# Patient Record
Sex: MALE | Race: WHITE
[De-identification: names, ages, dates, MRNs, and addresses within clinical notes are randomized per-mention and may not be internally consistent; named-entity substitution may affect disease eponyms.]

---

## 2019-02-10 ENCOUNTER — HOSPITAL ENCOUNTER (INPATIENT)
Dept: HOSPITAL 80 - FED | Age: 24
LOS: 5 days | Discharge: NURSING, REHAB OR LONG-TERM CARE | DRG: 956 | End: 2019-02-15
Attending: ORTHOPAEDIC SURGERY | Admitting: ORTHOPAEDIC SURGERY
Payer: COMMERCIAL
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
## 2019-02-10 DIAGNOSIS — T79.1XXA: ICD-10-CM

## 2019-02-10 DIAGNOSIS — S72.352A: ICD-10-CM

## 2019-02-10 DIAGNOSIS — R09.02: ICD-10-CM

## 2019-02-10 DIAGNOSIS — S52.022A: ICD-10-CM

## 2019-02-10 DIAGNOSIS — D62: ICD-10-CM

## 2019-02-10 DIAGNOSIS — K59.00: ICD-10-CM
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
## 2019-02-10 DIAGNOSIS — I95.9: ICD-10-CM

## 2019-02-10 DIAGNOSIS — E87.1: ICD-10-CM

## 2019-02-10 DIAGNOSIS — S72.142A: Primary | ICD-10-CM

## 2019-02-10 LAB — PLATELET # BLD: 233 10^3/UL (ref 150–400)

## 2019-02-10 PROCEDURE — 0PSL04Z REPOSITION LEFT ULNA WITH INTERNAL FIXATION DEVICE, OPEN APPROACH: ICD-10-PCS | Performed by: ORTHOPAEDIC SURGERY

## 2019-02-10 PROCEDURE — A4565 SLINGS: HCPCS

## 2019-02-10 PROCEDURE — 0QS904Z REPOSITION LEFT FEMORAL SHAFT WITH INTERNAL FIXATION DEVICE, OPEN APPROACH: ICD-10-PCS | Performed by: ORTHOPAEDIC SURGERY

## 2019-02-10 PROCEDURE — C1713 ANCHOR/SCREW BN/BN,TIS/BN: HCPCS

## 2019-02-10 PROCEDURE — C1769 GUIDE WIRE: HCPCS

## 2019-02-10 PROCEDURE — 0QS706Z REPOSITION LEFT UPPER FEMUR WITH INTRAMEDULLARY INTERNAL FIXATION DEVICE, OPEN APPROACH: ICD-10-PCS | Performed by: ORTHOPAEDIC SURGERY

## 2019-02-10 PROCEDURE — 2W3DX1Z IMMOBILIZATION OF LEFT LOWER ARM USING SPLINT: ICD-10-PCS | Performed by: ORTHOPAEDIC SURGERY

## 2019-02-10 NOTE — PDANEPAE
ANE History of Present Illness





L femur and elbow ORIF





ANE Past Medical History





- Cardiovascular History


Hx Hypertension: No


Hx Arrhythmias: No


Hx Chest Pain: No


Hx Coronary Artery / Peripheral Vascular Disease: No


Hx CHF / Valvular Disease: No


Hx Palpitations: No





- Pulmonary History


Hx COPD: No


Hx Asthma/Reactive Airway Disease: No


Hx Recent Upper Respiratory Infection: No


Hx Oxygen in Use at Home: No


Hx Sleep Apnea: No





- Endocrine History


Hx Diabetes: No


Hypothyroid: No


Hyperthyroid: No


Obesity: no





- Renal History


Hx Renal Disorders: No





- Liver History


Hx Hepatic Disorders: No





- Neurological & Psychiatric Hx


Hx Neurological and Psychiatric Disorders: No





- Cancer History


Hx Cancer: No





- GI History


GERD: no


Hx Gastrointestinal Disorders: No





- Chronic Pain History


Chronic Pain: No





- Surgical History


Prior Surgeries: none





ANE Review of Systems


Review of Systems: 








- Exercise capacity


METS (RN): 4 METS





ANE Patient History





- Allergies


Allergies/Adverse Reactions: 








No Known Allergies Allergy (Verified 02/10/19 18:36)


 








- Home Medications


Home Medications: 








NK [No Known Home Meds]  02/10/19 [Last Taken Unknown]








- NPO status


NPO Since - Liquids (Date): 02/10/19


NPO Since - Liquids (Time): 14:30


NPO Since - Solids (Date): 02/10/19


NPO Since - Solids (Time): 13:30





- Smoking Hx


Smoking Status: Never smoked


Marijuana use: No





- Alcohol Use


Alcohol Use: Other (3 drinks/week)





- Family Anes Hx


Family Anes Hx: none





ANE Labs/Vital Signs





- Labs


Result Diagrams: 


 02/10/19 19:15





 02/10/19 19:15





- Vital Signs


Blood Pressure: 135/70


Heart Rate: 89


Respiratory Rate: 17


O2 Sat (%): 96


Height: 177.8 cm


Weight: 65.771 kg





ANE Physical Exam





- Airway


Neck exam: FROM


Mallampati Score: Class 1


Mouth exam: normal dental/mouth exam





- Pulmonary


Pulmonary: clear to auscultation





- Cardiovascular


Cardiovascular: regular rate and rhythym





- ASA Status


ASA Status: II, E





ANE Anesthesia Plan


Anesthesia Plan: general endotracheal anesthesia
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
## 2019-02-11 LAB — PLATELET # BLD: 171 10^3/UL (ref 150–400)

## 2019-02-11 RX ADMIN — OXYCODONE HYDROCHLORIDE AND ACETAMINOPHEN PRN TAB: 5; 325 TABLET ORAL at 07:38

## 2019-02-11 RX ADMIN — OXYCODONE HYDROCHLORIDE AND ACETAMINOPHEN PRN TAB: 5; 325 TABLET ORAL at 15:38

## 2019-02-11 RX ADMIN — SODIUM CHLORIDE SCH MLS: 900 INJECTION, SOLUTION INTRAVENOUS at 17:39

## 2019-02-11 RX ADMIN — OXYCODONE HYDROCHLORIDE AND ACETAMINOPHEN PRN TAB: 5; 325 TABLET ORAL at 08:45

## 2019-02-11 RX ADMIN — OXYCODONE HYDROCHLORIDE AND ACETAMINOPHEN PRN TAB: 5; 325 TABLET ORAL at 12:46

## 2019-02-11 RX ADMIN — RIVAROXABAN SCH MG: 10 TABLET, FILM COATED ORAL at 09:57

## 2019-02-11 RX ADMIN — OXYCODONE HYDROCHLORIDE PRN MG: 15 TABLET ORAL at 17:39
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
## 2019-02-11 NOTE — POSTANESTH
Post Anesthetic Evaluation


Cardiovascular Status: Similar to Pre-Op Cond, Other, See Comment (Pt. with 

intermittent tachycardia up to high 130's without any other signs and without 

complaints other than surgical pain. EKG with ST and prolonged QT, will place 

in telemetry.)


Respiratory Status: Normal, Stable


Level of Consciousness/Mental Status: Can Participate in Eval


Pain Control: Adequate, Prn Tx Ordered


Nausea/Vomiting Control: Adequate, Prn Tx Ordered


Complications Possibly Related to Anesthesia: None Noted
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
## 2019-02-11 NOTE — POSTOPPROG
Post Op Note


Date of Operation: 02/11/19


Surgeon: Adry Taylor


Anesthesia: LMA


Pre-op Diagnosis: l femur fx x2 and l olecranon fx


Procedure: l tfn with fluoro and l orif olecranon with fluoro


Inf/Abcess present in the surg proc area at time of surgery?: No


Depth: Deep Incisional (Fascial)


EBL: 100-500

## 2019-02-11 NOTE — GCON
[f rep st]



                                                                    CONSULTATION





CHIEF COMPLAINT:  Left hip pain, left arm pain.



PRESENT ILLNESS:  23-year-old male skiing [place] at [facility] [place] yesterday, struck a tree, brought in by a
mbulance.  Emergency room evaluation identified left olecranon fracture, as well as left intertrochan
teric hip fracture and midshaft left femur fracture.  The patient denied neck, chest, or abdominal pa
in at that time.  I was asked to see the patient for trauma consultation.



ALLERGIES:  None.



CURRENT MEDICATIONS:  None.



SOCIAL HISTORY:  Works as a [employer] technician.  Nonsmoker.  No alcohol use.



REVIEW OF SYSTEMS:  Denies asthma, heart trouble, diabetes, epilepsy, or rheumatic fever.



FAMILY HISTORY:  Noncontributory.



PAST SURGICAL HISTORY:  Previous surgery none.



PHYSICAL EXAM:  HEENT:  PERRL, EOMI.  Sclerae nonicteric.  Pharynx clear.  NECK:  Cervical spine nont
anatoliy.  Neck supple without adenopathy.  No supraclavicular or axillary crepitus.  Posterior spinal e
lements are nontender.  CHEST:  Sternum is nontender.  Chest stable to compression.  Lungs clear.  HE
ART:  Normal S1, S2 without murmur.  ABDOMEN:  Soft, benign.  MUSCULOSKELETAL:  Pelvis stable to comp
ression.  Right lower extremity is atraumatic and unremarkable.  Left is status post ORIF intertrocha
nteric and midshaft femur fracture with a long nail.  Left upper extremity is wrapped in an Ace and i
s status post ORIF of the olecranon fracture.



ASSESSMENT:  Patient is doing well status post open reduction, internal fixation olecranon fracture, 
left femur fracture, left hip fracture.  He is currently tachycardic, but seems quite comfortable.  A
bdomen soft, benign.  Lungs clear.  I do not think there is much likelihood of occult chest or abdomi
nal injury and I am not ordering any further imaging.  His hematocrit is reasonable.



RECOMMENDATIONS:  Postop care per Orthopedic surgery.





Job #:  [id number]

## 2019-02-11 NOTE — GCON
[f rep st]



                                                                    CONSULTATION





DATE OF CONSULTATION:  02/11/2019



CHIEF COMPLAINT:  Tachycardia.



HISTORY OF PRESENT ILLNESS:  Mr. Estrada is a pleasant 23-year-old gentleman with no major past medic
al history who was admitted to the [facility] on 02/10/2019 after a skiing accident a
t [place] sustaining left elbow and left femur fractures.  He subsequently underwent surgical interven
tion to address the fractures and had been doing reasonably well until the afternoon when he was note
d to be tachycardic with heart rate in the 140s.  The patient denied being in any significant pain wh
en he was tachycardic.  He seemed to be relatively asymptomatic, not experiencing any palpitations or
 chest pains or shortness of breath.  He does not have any personal or family history of thromboembol
ic disease.  He has not noted any recent rashes.  There have not been any acute neurologic changes in
 his status either.  An ECG was obtained which showed sinus tachycardia.



PAST MEDICAL HISTORY:  No major past medical history other than recent left olecranon and femur fract
ure.



PAST SURGICAL HISTORY:  No other surgeries other than orthopedic surgeries from this hospitalization.




MEDICATIONS:  Patient did not have any daily medications prior to coming into the hospital.  He is cu
rrently on acetaminophen, morphine, oxycodone, Xarelto 10 mg daily, and tramadol.



ALLERGIES:  No known drug allergies.



FAMILY HISTORY:  Mother and father both living, reportedly healthy.  There is no family history of DV
T or pulmonary embolism.



SOCIAL HISTORY:  Patient is a nonsmoker.  He is currently single but has a girlfriend.  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
he eco tech industry.



REVIEW OF SYSTEMS:  CONSTITUTIONAL:  No complaints of any subjective fevers or chills.  ENT:  No rece
nt upper respiratory illnesses.  CARDIOVASCULAR:  No complaints of chest pains, palpitations, or sync
opal episodes.  RESPIRATORY:  No subjective shortness of breath or pleuritic-type chest pains.  GI:  
No complaints of any nausea, vomiting, diarrhea, or constipation.  No focal abdominal pain.  :  No 
reports of any difficulty with urination.  NEUROLOGIC:  No complaints of any confusion or focal weakn
ess.  No complaints of headaches.  HEMATOLOGIC:  No history of deep vein thrombosis or pulmonary embo
lism.  PSYCHIATRIC:  No history of anxiety or depression.  ENDOCRINE:  No history of polyuria or heat
 intolerance.  SKIN:  No new skin rashes.  MUSCULOSKELETAL:  Patient states he has had good pain cont
rol with current pain medications in place.



PHYSICAL EXAM:  VITAL SIGNS:  Temperature 36.6, blood pressure 126/72, heart rate 145 but has improve
d into the low 100s currently, respiratory rate of 11, satting 91% on 2 L.  I did take him off oxygen
 during my evaluation and he became hypoxic around 83% on room air.  GENERAL:  Patient appears comfor
table.  He is awake, alert, conversant, able to provide a good history.  HEENT:  Extraocular movement
s appear intact.  No scleral icterus is noted.  NECK:  Supple.  No thyroid enlargement appreciated.  
CHEST:  Clear on auscultation with normal respiratory effort anteriorly.  HEART:  Regular, tachycardi
c.  No murmurs appreciated.  ABDOMEN:  Soft, nontender, nondistended.  :  No Medina catheter in plac
e.  EXTREMITIES:  No significant pitting edema or calf pain with palpation.  NEUROLOGIC:  Cranial ner
ves 2-12 appear grossly intact with 5/5 strength in extremities with limited testing of the left extr
emity secondary to fractures.



LABS:  White blood cell count initially 25, but on repeat today this is improved to 13, hemoglobin 10
, platelets 171, sodium 129, potassium 4.4, chloride 101, bicarb 25, BUN 15, creatinine 0.5, glucose 
of 125.  TSH is 0.787.  D-dimer 4.18.



IMAGING:  CT angiography of the chest did not show any evidence of a pulmonary embolism.  There is a 
nonspecific 4 x 3 mm noncalcified nodule at the periphery of the right major fissure.



ASSESSMENT/PLAN:  

1.  Tachycardia.  Patient appears to have sinus tachycardia by ECG.  Uncertain etiology currently.  T
here is no evidence of a pulmonary embolism by CT study.  I did consider the possibility of a fat emb
anita considering he had a femur fracture.  At the current time, his tachycardia seems to be subsiding 
and I recommend ongoing telemetry monitoring.  I did also consider the possibility of anemia playing 
a role as his hemoglobin did decrease from 14 to 10.8 within the past 24 hours.  I recommend a repeat
 hemoglobin again tomorrow morning for close monitoring.

2.  Acute hypoxic respiratory failure-no obvious etiology on CT of the chest to account for the respi
ratory failure, but he did become hypoxic on room air during my evaluation into the 83% on room air. 
 Possibly related to pain medications and decreased respiratory drive.  Continue to monitor closely a
nd wean oxygen as able.

3.  Leukocytosis-no evidence of fevers at the current time.  Has improved substantially from 25 to 13
.  This may be reactive secondary to physical stress from his fracture.  Repeat has been ordered agai
n for tomorrow morning.

4.  Hyponatremia-IV fluids changed from D5 NS to normal saline with a rate of 75 mL/h.  Reassess agai
n tomorrow morning.

5.  Fractures-left olecranon and femur.  Status post surgical intervention.  Continue work with Physi
fritz Therapy and Occupational Therapy and further recommendations per Orthopedic Surgery.

6.  DVT prophylaxis-patient has been on Xarelto.  Continue.

7.  Disposition per Trauma and Orthopedic Surgery. 



I appreciate the opportunity to help out on this patient's case and hospital service will follow braulio espinal during this hospitalization.





Job #:  [id number]

## 2019-02-11 NOTE — GDS
[f rep st]



                                                             DISCHARGE SUMMARY





CURRENT COMPLAINT:  Left elbow and left thigh pain.



HISTORY OF PRESENT ILLNESS:  This is a 23-year-old male, who hit a tree while snowboarding at [place].
  He had pain and deformity at his left arm as well as his left leg.  He was brought to the emergency
 room, diagnosed with a comminuted left olecranon fracture and a left intertrochanteric fracture and 
midshaft femur fracture.



ALLERGIES:  I list no drug allergies.



MEDICATIONS:  No medications.



PAST SURGICAL HISTORY:  No prior surgeries.



PHYSICAL EXAMINATION:  HEENT:  The patient's pupils are equal, round, and reactive to light.  CHEST: 
 Clear to auscultation.  HEART:  Regular rate and rhythm.  ABDOMEN:  Soft and nontender.  NEUROLOGICA
L:  He is grossly neurologically intact into the radial, median, and ulnar nerves with EPL, FPL firin
g and the FDS for the index and small finger working.  On his left lower extremity, he had pain to lo
g rolling.  He was grossly neurologically intact to the dorsal, lateral, and plantar portions of the 
foot with EHL and FHL working.



X-RAY:  Exam reveals a comminuted fracture of the left ulna and then a comminuted midshaft fracture o
f the femur and an intertrochanteric fracture of the left hip.



ASSESSMENT AND PLAN:  The patient is status post left comminuted olecranon fracture, left intertrocha
nteric fracture, left midshaft femur fracture.  He will be taken to the operating room for formal fix
ation of each of these injuries once his nothing-by-mouth status is allowing him to go to the operati
 room,





Job #:  [id number]

## 2019-02-11 NOTE — PDMN
Medical Necessity


Medical necessity: MCG:  S470 femur fx, shaft, internal fixation  2 days:       

OP:  L trochanteric femoral nailing with fluoroscopy ; L olecranon ORIF  skiing 

accident  vs tree:     Xray:  displaced and rotated, comminuted olecranon fx., 

intertrochanteric and femoral shaft fx - acute transverse comminuted fx of 

midshaft of the femur  with second oblique intertrochanteric fx with mild 

posterior angulation

## 2019-02-11 NOTE — SOAPPROG
SOAP Progress Note


Assessment/Plan: 


Assessment: David is currently POD#1 s/p left femur TFN, left olecranon ORIF. 

He reports his pain is controlled with po pain medication. He has been 

tachycardic. Trauma has seen him and has done stat EKG which showed sinus 

tachycardia. 





PE: Upon evaluation of the left elbow, dressing is clean and dry. He does have 

decreased sensation diffusely throughout the left hand. ROM of the wrist and 

digits is WNL and strength testing is WNL. Brisk cap refill. Upon evaluation of 

the left leg, dressings are clean and dry. Sensation intact to light touch. 

Calf soft to compression without pain. DF/PF of foot intact.








Plan: WBAT LLE. NWB LUE. Keep dressings clean and dry. Continue ROM of the 

digits. Ace wrap was loosened. I do anticipate numbness of hand will improve. 

As for the persistent tachycardia, we will get a hospitalist consult





02/11/19 13:15





Objective: 





 Vital Signs











Temp Pulse Resp BP Pulse Ox


 


 36.6 C   145 H  11 L  126/72 H  91 L


 


 02/11/19 12:00  02/11/19 12:00  02/11/19 12:00  02/11/19 12:00  02/11/19 12:00








 Laboratory Results





 02/11/19 07:20 





 02/10/19 19:15 





 











 02/10/19 02/11/19 02/12/19





 05:59 05:59 05:59


 


Intake Total  2050 


 


Output Total  800 250


 


Balance  1250 -250














ICD10 Worksheet


Patient Problems: 


 Problems











Problem Status Onset


 


Anemia Acute  


 


Femur fracture, left Acute  


 


Fever Acute  


 


Tachycardia Acute

## 2019-02-12 LAB — PLATELET # BLD: 134 10^3/UL (ref 150–400)

## 2019-02-12 RX ADMIN — HYDROMORPHONE HYDROCHLORIDE PRN MG: 2 TABLET ORAL at 21:10

## 2019-02-12 RX ADMIN — ACETAMINOPHEN SCH MG: 500 TABLET ORAL at 22:14

## 2019-02-12 RX ADMIN — HYDROMORPHONE HYDROCHLORIDE PRN MG: 2 TABLET ORAL at 16:51

## 2019-02-12 RX ADMIN — SODIUM CHLORIDE SCH MLS: 900 INJECTION, SOLUTION INTRAVENOUS at 16:01

## 2019-02-12 RX ADMIN — RIVAROXABAN SCH MG: 10 TABLET, FILM COATED ORAL at 08:12

## 2019-02-12 RX ADMIN — ACETAMINOPHEN SCH MG: 500 TABLET ORAL at 13:43

## 2019-02-12 RX ADMIN — OXYCODONE HYDROCHLORIDE PRN MG: 15 TABLET ORAL at 08:12

## 2019-02-12 RX ADMIN — HYDROMORPHONE HYDROCHLORIDE PRN MG: 2 TABLET ORAL at 13:43

## 2019-02-12 RX ADMIN — SODIUM CHLORIDE SCH MLS: 900 INJECTION, SOLUTION INTRAVENOUS at 14:31

## 2019-02-12 NOTE — CPEKG
Test Reason : OPEN

Blood Pressure : ***/*** mmHG

Vent. Rate : 104 BPM     Atrial Rate : 104 BPM

   P-R Int : 129 ms          QRS Dur : 074 ms

    QT Int : 308 ms       P-R-T Axes : 070 058 037 degrees

   QTc Int : 405 ms

 

Sinus tachycardia

 

Confirmed by Boy Fonseca ([id number]) on 2/12/2019 3:55:22 PM

 

Referred By: Adry Taylor           Confirmed By:Boy Fonseca

## 2019-02-12 NOTE — HOSPPROG
Hospitalist Progress Note


Assessment/Plan: 








David is a 22 y/o male who had a skiing accident at [place] on 2/10/2019.  He 

sustained a left elbow and left femur fx.  He has had surgery w orthopedics, 

and trauma is following.  Hospitalist were asked to see him in regards to 

tachycardia. CTA is negative for a PE, but has non specific nodules that should 

get f/u.  First encounter, chart reviewed. Reviewed his care w trauma surgeon, 

Dr Paul.


 


*Tachycardia.  


-CTA shows not PE


-? fat emboli from femur fx-has no petechial rash, but cont to be hypoxic and 

tachycardic, also now has a fever


-will ask the intensivist to see





*anemia


-stat hgb and hct didn't trend down


-cont close monitoring





*acute hypoxemia


-O2 sats drop quickly to 82 %


-Chest x ray shows nothing acute





*Leukocytosis


-stress induced





*hyponatremia


-recheck in a.m.





*Left olecranon fx and femur fx





*dvt prophylaxis: Xarelto





*non specific 4 x 3 mm noncalcified nodule at the periphery of r major fissure  





*plan: spoke w Dr Savage and he will see David later today, continue 

supportive therapy. 











Subjective: David is sleepy, says his pain is under control.


Objective: 


 Vital Signs











Temp Pulse Resp BP Pulse Ox


 


 37.3 C   148 H  20   121/61 H  82 L


 


 02/12/19 11:01  02/12/19 12:50  02/12/19 12:50  02/12/19 11:01  02/12/19 12:50








 Laboratory Results





 02/12/19 12:00 





 02/11/19 15:15 





 











 02/11/19 02/12/19 02/13/19





 05:59 05:59 05:59


 


Intake Total 2050 1550 


 


Output Total 800 850 


 


Balance 1250 700 














- Physical Exam


Constitutional: appears nourished, uncomfortable


Eyes: PERRL


Ears, Nose, Mouth, Throat: hearing normal


Cardiovascular: regular rate and rhythym, tachycardia


Respiratory: no respiratory distress, reduced air movement (right base), other (

poor inspiration )


Skin: warm, other (left hand w good cms, no drainage from incision sites on 

left leg, some swellling)


Neurologic: AAOx3


Psychiatric: interacting appropriately, other (sleepy)





ICD10 Worksheet


Patient Problems: 


 Problems











Problem Status Onset


 


Femur fracture, left Acute  














- ICD10 Problem Qualifiers


(1) Femur fracture, left

## 2019-02-12 NOTE — SOAPPROG
SOAP Progress Note


Assessment/Plan: 


Assessment:


























Plan:





Subjective: 





states his pain is manageable





dressing C&D with fott and hand NVI with min decrease in hand sensation





cont pt and pain meds


dc to home when cleared by pt


Objective: 





 Vital Signs











Temp Pulse Resp BP Pulse Ox


 


 37.3 C   118 H  16   121/61 H  94 


 


 02/12/19 11:01  02/12/19 11:01  02/12/19 11:01  02/12/19 11:01  02/12/19 11:01








 Laboratory Results





 02/12/19 05:22 





 02/11/19 15:15 





 











 02/11/19 02/12/19 02/13/19





 05:59 05:59 05:59


 


Intake Total 2050 1550 


 


Output Total 800 850 


 


Balance 1250 700 














ICD10 Worksheet


Patient Problems: 


 Problems











Problem Status Onset


 


Femur fracture, left Acute  














- ICD10 Problem Qualifiers


(1) Femur fracture, left

## 2019-02-12 NOTE — TRAUMAPNT
Trauma Tertiary Progress Note


New Findings: Hypoxic to 80 on Room Air, Tachycardia to 150's, orthostatic


Assessment/Plan: 


PAD#2





Assessment:





Tachycardia, orthostatic hypotension, low sats, good urine output over night, 

PE w/u negative yesterday. On Xarelto.








Plan:





Presume hypovolemic; Will fluid bolus, follow I&O more closely, check CXR and 

get f/u cbc in AM. If no response/findings, will consider cardiac echo and f/u 

abdominal CT to look for missed visceral injury. 


Subjective: 





my left hip hurts


Objective: 





 Vital Signs











Temp Pulse Resp BP Pulse Ox


 


 37.3 C   118 H  16   121/61 H  94 


 


 02/12/19 11:01  02/12/19 11:01  02/12/19 11:01  02/12/19 11:01  02/12/19 11:01








 Laboratory Results





 02/12/19 12:00 





 02/11/19 15:15 





 











 02/11/19 02/12/19 02/13/19





 05:59 05:59 05:59


 


Intake Total 2050 1550 


 


Output Total 800 850 


 


Balance 1250 700 














Physical Exam





- Physical Exam


General Appearance: alert, mild distress


Respiratory: chest non-tender, lungs clear, normal breath sounds


Cardiac/Chest: tachycardia, other (no murmurs, or gallops, S3 or S4)


Abdomen: non-tender, soft


Male Genitalia: deferred


Rectal: deferred


Back: Normal inspection


Skin: normal color, warm/dry


Neuro/Psych: alert, normal mood/affect, oriented x 3


Time Spent w/Patient (minutes): 25

## 2019-02-12 NOTE — PDCONSULT
Consultant Note: 





ASSESSMENT


23-year-old male admitted after traumatic left arm, pevlic and left femur 

fractures now with tachycardia and hypoxemia.  CT chest without pulmonary 

embolism or focal infiltrates.  Subsequent chest x-ray clear.  ABG performed 

with moderately increased A-a gradient.  Differential diagnosis is broad and 

includes fat emboli syndrome with cytokine release and V/Q mismatching, 

hypoventilation and dead space ventilation from pain medications.  Increased A-

a gradient suggests V/Q mismatching and cytokine release and argues against 

hypoventilation.  Patient does not have significant neurologic sequela rash as 

would be expected with fat emboli however presentation can vary.  No evidence 

of DIC.  Suspect hemoglobin dropped is due to long bone fractures and bleed.  

Internal organ damage is also possible





# hypoxemia.  A-A gradient 34 (elevated) See differential diagnosis above


# hypotension


# tachycardia


# fat emboli syndrome.  See explanation as above.  Risk factors include long 

bone fracture and pelvic fracture


# left comminuted femur fracture, olecranon fracture and pelvic fractures.  

Status post ORIF 2/11/19





PLAN


# I agree with IV fluid bolus


# agree trending hemoglobins


# treatment for fat emboli, if this truly what patient has, is generally 

supportive and resolves spontaneously.  Occasionally patients develop 

cardiopulmonary collapse and require support


# no role for steroids





IMAGING


Personally reviewed interpreted radiographic images well as formal radiology 

reads


2/11/2019 CTA chest clear lung parenchyma, no PE, 1 small calcified pulmonary 

nodule consistent with prior granulomatous disease


2/12/19 CXR clear





ABG


7.45/28/107 on 3L/min NC





CONSULT


I was asked by Muna Ball of [facility] to evaluate this patient 

for hypoxemia and possible fat emboli syndrome





HPI


This is a very pleasant 23-year-old male who works as a due to technical 

 in the field who was skiing [place] and struck a tree and was 

subsequently found to have a comminuted left femur fracture, inter trochanteric 

hip fracture and mid shaft left femoral fracture.  He complained to severe 

pain.  Patient's femur fracture and elbow fractures or repaired.  Patient was 

initially doing well on the floor however subsequent developed tachycardia 

hypoxemia and relative hypotension.  He denies chest pain, shortness of breath, 

fevers or chills, nausea vomiting.  He complains of pain he he is otherwise 

healthy denies smoking drinking or other medical problems





Allergies


No known drug allergies





Past medical history


No past medical history





Social history


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  Lives in [place].





Family history


No family history of lung disease





Review of system


Comprehensive 10 point review of systems is obtained is negative except as per 

HPI





Physical exam


Afebrile, pulse 118, blood pressure 106/68, respiratory rate 18, 93% on 3 L 

nasal cannula


GEN:  No acute distress, resting in bed


NEURO: A&Ox3, CN 2-12 GI


HEENT: PERRL, EOMI, MMM, OP clear


NECK: supple, trachea midline


CHEST normal shape, no pes excavatum


CVS: rrr no m/r/g


PULM: CTA B, no wheezes/rales/rhonchi


ABD: soft, NT, ND, NABS


EXT:  Left leg and left arm bandaged clean dry and intact


SKIN: warm, dry, intact, no rash


PSYCH CAM negative, appropriate affect





Lab


I personally reviewed interpreted patient's labs see Arden Reed for details


2/12/2019 ABG 7.45/28/107 on 3L NC

## 2019-02-13 LAB — PLATELET # BLD: 109 10^3/UL (ref 150–400)

## 2019-02-13 RX ADMIN — HYDROMORPHONE HYDROCHLORIDE PRN MG: 2 TABLET ORAL at 12:51

## 2019-02-13 RX ADMIN — HYDROMORPHONE HYDROCHLORIDE PRN MG: 2 TABLET ORAL at 16:52

## 2019-02-13 RX ADMIN — HYDROMORPHONE HYDROCHLORIDE PRN MG: 2 TABLET ORAL at 04:22

## 2019-02-13 RX ADMIN — SODIUM CHLORIDE SCH MLS: 900 INJECTION, SOLUTION INTRAVENOUS at 05:58

## 2019-02-13 RX ADMIN — DOCUSATE SODIUM AND SENNOSIDES SCH TAB: 50; 8.6 TABLET ORAL at 22:14

## 2019-02-13 RX ADMIN — ACETAMINOPHEN SCH MG: 500 TABLET ORAL at 22:17

## 2019-02-13 RX ADMIN — HYDROMORPHONE HYDROCHLORIDE PRN MG: 2 TABLET ORAL at 08:48

## 2019-02-13 RX ADMIN — POLYETHYLENE GLYCOL 3350 SCH GM: 17 POWDER, FOR SOLUTION ORAL at 14:19

## 2019-02-13 RX ADMIN — ACETAMINOPHEN SCH MG: 500 TABLET ORAL at 14:17

## 2019-02-13 RX ADMIN — RIVAROXABAN SCH MG: 10 TABLET, FILM COATED ORAL at 08:18

## 2019-02-13 RX ADMIN — ACETAMINOPHEN SCH MG: 500 TABLET ORAL at 05:52

## 2019-02-13 NOTE — PDCONSULT
Consultant Note: 





2/13/2019





Appears to be improving. Agree that fat emboli is the most probable diagnosis. 

Nothing further to add. Let me know if i can be of any further assistance.


Would continue SCDs in addition to Xarelto.

## 2019-02-13 NOTE — HOSPPROG
Hospitalist Progress Note


Assessment/Plan: 








David is a 24 y/o male who had a skiing accident at [place] on 2/10/2019.  He 

sustained a left elbow and left femur fx.  He has had surgery w orthopedics, 

and trauma is following.  Hospitalist were asked to see him in regards to 

tachycardia. CTA is negative for a PE, but has non specific nodules that should 

get f/u.  





*Tachycardia-ongoing but less tachycardic today 


-CTA shows not PE


-? fat emboli from femur fx-has no petechial rash, but cont to be hypoxic and 

tachycardic


-appreciate the intensivist seeing David





*anemia/acute blood loss


-hold transfusion-not symptomatic


-if cont to trend down, have recommended he get a transfusion





*acute hypoxemia


-O2 sats drop quickly to 82 %


-Chest x ray shows nothing acute





*low grade fever


-encouraging patient to cough and deep breath, use IS





*Leukocytosis


-resolved





*hyponatremia


-Na still at 129


-will check urine studies





*Left olecranon fx and femur fx


-care per orthopedics





*dvt prophylaxis: Xarelto





*non specific 4 x 3 mm noncalcified nodule at the periphery of r major fissure  





*plan: check urine studies, repeat labs in a.m.





Subjective: David is feeling better today, doesn't like the hospital food.


Objective: 


 Vital Signs











Temp Pulse Resp BP Pulse Ox


 


 37.4 C   117 H  18   119/58 L  92 


 


 02/13/19 07:38  02/13/19 07:38  02/13/19 07:38  02/13/19 07:38  02/13/19 07:38








 Laboratory Results





 02/13/19 05:04 





 02/12/19 16:10 





 











 02/12/19 02/13/19 02/14/19





 05:59 05:59 05:59


 


Intake Total 1550 2475 


 


Output Total 850 2100 675


 


Balance 700 375 -675














- Physical Exam


Constitutional: uncomfortable


Eyes: PERRL


Ears, Nose, Mouth, Throat: hearing normal


Cardiovascular: regular rate and rhythym, tachycardia


Respiratory: no respiratory distress, reduced air movement (bibasilar)


Skin: warm, other (left finges swollen, warm)


Musculoskeletal: generalized weakness


Neurologic: AAOx3


Psychiatric: interacting appropriately, not anxious





ICD10 Worksheet


Patient Problems: 


 Problems











Problem Status Onset


 


Femur fracture, left Acute  














- ICD10 Problem Qualifiers


(1) Femur fracture, left

## 2019-02-13 NOTE — ECHO
[email or web address]





[address] [address] 

Main: [phone] 



Fax: 



Transthoracic Echocardiogram 

Name:             JHON HERMOSILLO                          MR#:

[id number]

Study Date:       2019                              Study Time:

07:36 AM

YOB: 1995                              Age:

23 year(s)

Height:           177.8 cm (70 in.)                       Weight:

65.77 kg (145 lb.)

BSA:              1.82 m2                                 Gender:

Male

Examination:      Echo                                    Indication:

persistent tachycardia, ? fat emboli,



tachycardia 

Image Quality:    Adequate                                Contrast: 

Requested by:     Muna Ball                         BP:

119 mmHg/58 mmHg

Heart Rate:                                               Rhythm: 

Indication:       persistent tachycardia, ? fat emboli, tachycardia 



Procedure Staff 

Ultrasound Technician:   Syeda Rios [facility] 

Reading Physician:       Niko German MD 

Requesting Provider: 



Conclusions:           Normal global systolic LV function.  

The ejection fraction is visually estimated to be 60 %.  

The mitral valve is normal in appearance and function. 



Measurements: 

Chambers                     Valvular Assessment AV/MV

Valvular Assessment TV/PV



Normal                                    Normal

Normal

Name         Value      Range              Name         Value Range

Name           Value Range

Ao Aline (2D): 2.5 cm     (1.4 cm-2.6            AV Vmax:     1.39 m/s

(1 m/s-1.7        PV Vmax:       0.98 m/s (0.6 m/s-0.9



cm)                                   m/s)

m/s)

IVSd (2D):   0.9 cm (0.6 cm-1.1                AV maxP mmHg ( -

)           PV PGmax:      4  mmHg ( - )



cm)                AV meanP mmHg ( - )  

LVDd (2D):   4.2 cm     (4.2 cm-5.9            KEVIN (VTI):   2.5 cm ( -

)



cm)                MV E Vmax:   0.85 m/s ( - )  

LVDs (2D):   2.6 cm     (2.1 cm-4              MV A Vmax:   0.60 m/s (

- )



cm)                MV E/A:      1.42  ( - )  

LVPWd (2D):  0.8 cm     (0.6 cm-1 



cm)                    MV PHT:      0.054 s ( - )  

LVOTd        1.9 cm     1.9 cm mm              MVA (PHT):   4.1 s ( -

)



Visual EF:   60 % 



Continued Measurements: 

Chambers                     Valvular Assessment AV/MV  



Name                     Value             Name

Value

LADs:                  3.1 cm                  MV DecTime:

183 m/s

LADs Lon.3 cm                  MV E' Septal:

0.12  m/s

LA Area:               12.7 cm2            MV E/E' Septal:       7.00



LA Volume:             22 ml               MV E/E' Lateral:      7.00





Patient: JHON HERMOSILLO                       MRN: [id number]

Study Date: 2019   Page 1 of 2

07:36 AM 









LA Volume Index: 12.1 ml/m2   

RA Area:               9.5 cm2   



Additional Vessels  



Name                       Value  

Inferior Vena Cava:    1.7 cm    



Findings:              Left Ventricle: 

Normal size left ventricle. No LV hypertrophy. Normal global systolic

LV function. The ejection

fraction is visually estimated to be 60 %. Normal diastolic LV

function.

Right Ventricle: 

Normal size right ventricle. Normal RV function.  

Left Atrium: 

The left atrium is normal in size.  

Right Atrium: 

The right atrium is normal in size.  

Mitral Valve: 

The mitral valve is normal in appearance and function. There is no

significant mitral valve

regurgitation. No mitral stenosis is present.  

Aortic Valve: 

The aortic valve is tri-leaflet. There is no significant aortic valve

regurgitation. No aortic valve

stenosis is present.  

Tricuspid Valve: 

The tricuspid valve is normal in appearance and function. There is no

significant tricuspid valve

regurgitation.  

Pulmonic Valve: 

Pulmonary valve not well visualized.  

Aorta: 

The aorta is normal. Normal size aortic root measuring 2.5 cm.  

IVC: 

The IVC is normal sized.  

Pericardium: 

No pericardial effusion.  

Exam Comments: 







Electronically signed by Niko German MD on 2019 at 09:43 AM



(No Signature Object) 



Patient: JHON HERMOSILLO                       MRN: [id number]

Study Date: 2019   Page 2 of 2

07:36 AM 







[id number]

## 2019-02-13 NOTE — SOAPPROG
SOAP Progress Note


Assessment/Plan: 


Assessment: David is currently POD#3 s/p left femur TFN, left olecranon ORIF. 

He reports his pain is controlled with po pain medication. He has been up with 

PT to bedsid. 





PE: Upon evaluation of the left elbow, dressing is clean and dry. Sensation is 

back to normal ROM of the wrist and digits is WNL and strength testing is WNL. 

Brisk cap refill. Upon evaluation of the left leg, dressings are clean and dry. 

Sensation intact to light touch. Calf soft to compression without pain. DF/PF 

of foot intact.








Plan: WBAT LLE. NWB LUE. Keep dressings clean and dry. Continue ROM of the 

digits. Possible d/c to inpatient rehab





02/11/19 13:15





02/13/19 12:27





Objective: 





 Vital Signs











Temp Pulse Resp BP Pulse Ox


 


 37.5 C   109 H  16   119/50 L  99 


 


 02/13/19 11:53  02/13/19 11:53  02/13/19 11:53  02/13/19 11:53  02/13/19 11:53








 Laboratory Results





 02/13/19 05:04 





 02/12/19 16:10 





 











 02/12/19 02/13/19 02/14/19





 05:59 05:59 05:59


 


Intake Total 1550 2475 


 


Output Total 850 2100 675


 


Balance 700 679 -673














ICD10 Worksheet


Patient Problems: 


 Problems











Problem Status Onset


 


Femur fracture, left Acute

## 2019-02-13 NOTE — ASMTCMCOM
CM Note

 

CM Note                       

Notes:

Today pt was able to work with PT, rec is inpatient rehab. OT continues to rec inpatient rehab. Pt 

wants to go to [facility] inpatient rehab, he is motivated and says he wants to get "back on my feet."



The inpatient rehab consult order was input in [employer] today and Melody in admissions was 

alerted. Pt girlfriend Michael has been bedside.



CM to follow. 

 

Date Signed:  02/13/2019 02:17 PM

Electronically Signed By:STEPHANIE Rivera

## 2019-02-14 LAB — PLATELET # BLD: 159 10^3/UL (ref 150–400)

## 2019-02-14 RX ADMIN — ACETAMINOPHEN SCH: 500 TABLET ORAL at 22:53

## 2019-02-14 RX ADMIN — HYDROMORPHONE HYDROCHLORIDE PRN MG: 2 TABLET ORAL at 18:30

## 2019-02-14 RX ADMIN — HYDROMORPHONE HYDROCHLORIDE PRN MG: 2 TABLET ORAL at 05:32

## 2019-02-14 RX ADMIN — DOCUSATE SODIUM AND SENNOSIDES SCH: 50; 8.6 TABLET ORAL at 21:46

## 2019-02-14 RX ADMIN — ACETAMINOPHEN SCH MG: 500 TABLET ORAL at 15:20

## 2019-02-14 RX ADMIN — ACETAMINOPHEN SCH MG: 500 TABLET ORAL at 06:14

## 2019-02-14 RX ADMIN — POLYETHYLENE GLYCOL 3350 SCH GM: 17 POWDER, FOR SOLUTION ORAL at 07:44

## 2019-02-14 RX ADMIN — RIVAROXABAN SCH MG: 10 TABLET, FILM COATED ORAL at 07:45

## 2019-02-14 RX ADMIN — HYDROMORPHONE HYDROCHLORIDE PRN MG: 2 TABLET ORAL at 09:40

## 2019-02-14 RX ADMIN — DOCUSATE SODIUM AND SENNOSIDES SCH TAB: 50; 8.6 TABLET ORAL at 07:43

## 2019-02-14 NOTE — SOAPPROG
SOAP Progress Note


Assessment/Plan: 


Assessment: David is currently POD#4 s/p left femur TFN, left olecranon ORIF. 

He reports his pain is controlled with po pain medication. He has been up with 

PT to bedside.





PE: Upon evaluation of the left elbow, dressing is clean and dry. Sensation is 

back to normal ROM of the wrist and digits is WNL and strength testing is WNL. 

Brisk cap refill. Upon evaluation of the left leg, dressings are clean and dry. 

Sensation intact to light touch. Calf soft to compression without pain. DF/PF 

of foot intact.








Plan: WBAT LLE. NWB LUE. Keep dressings clean and dry. Continue ROM of the 

digits and wrist. Continue working with PT on ROM, strengthening and gait of 

the LLE. Possible d/c to inpatient rehab





02/11/19 13:15





02/13/19 12:27





02/14/19 14:05





Objective: 





 Vital Signs











Temp Pulse Resp BP Pulse Ox


 


 37.2 C   97   18   121/63 H  99 


 


 02/14/19 11:09  02/14/19 11:09  02/14/19 11:09  02/14/19 11:09  02/14/19 11:09








 Laboratory Results





 02/14/19 05:05 





 02/14/19 05:00 





 











 02/13/19 02/14/19 02/15/19





 05:59 05:59 05:59


 


Intake Total 4145 1700 


 


Output Total 6463 2026 714


 


Balance 951 -8554 -785














ICD10 Worksheet


Patient Problems: 


 Problems











Problem Status Onset


 


Femur fracture, left Acute

## 2019-02-14 NOTE — HOSPPROG
Hospitalist Progress Note


Assessment/Plan: 








David is a 22 y/o male who had a skiing accident at [place] on 2/10/2019.  He 

sustained a left elbow and left femur fx.  He has had surgery w orthopedics, 

and trauma is following.  Hospitalist were asked to see him in regards to 

tachycardia. CTA is negative for a PE, but has non specific nodules that should 

get f/u.  





*Tachycardia-resolved


-CTA shows no PE





*anemia/acute blood loss


-hold transfusion-not symptomatic





*acute hypoxemia


-Chest x ray shows nothing acute





*low grade fever


-encouraging patient to cough and deep breath, use IS





*Leukocytosis


-resolved





*hyponatremia


-much improved w hydration





*Left olecranon fx and femur fx


-care per orthopedics





*dvt prophylaxis: Xarelto





*non specific 4 x 3 mm noncalcified nodule at the periphery of r major fissure  





*constipation


-bowel protocol





*plan: IP rehab evaluating for placement





Objective: 


 Vital Signs











Temp Pulse Resp BP Pulse Ox


 


 37.2 C   97   18   121/63 H  99 


 


 02/14/19 11:09  02/14/19 11:09  02/14/19 11:09  02/14/19 11:09  02/14/19 11:09








 Laboratory Results





 02/14/19 05:05 





 02/14/19 05:00 





 











 02/13/19 02/14/19 02/15/19





 05:59 05:59 05:59


 


Intake Total 2475 1700 


 


Output Total 2100 3125 600


 


Balance 375 -7360 -600














- Physical Exam


Constitutional: uncomfortable


Eyes: PERRL


Ears, Nose, Mouth, Throat: hearing normal


Cardiovascular: regular rate and rhythym, No tachycardia


Respiratory: no respiratory distress, reduced air movement


Skin: warm, other (left upper thigh area w swelling, good cms to left fingers)


Musculoskeletal: muscular tenderness, generalized weakness


Neurologic: AAOx3


Psychiatric: interacting appropriately, flat affect





ICD10 Worksheet


Patient Problems: 


 Problems











Problem Status Onset


 


Femur fracture, left Acute  














- ICD10 Problem Qualifiers


(1) Femur fracture, left

## 2019-02-14 NOTE — ASMTCMCOM
CM Note

 

CM Note                       

Notes:

PT/OT continue to rec inpatient rehab. Melody in admissions with [facility] inpatient rehab will submit 

insurance authorization to [facility]. Updated pt girlfriend Michael, pt was sleeping. 



Pt father Telly is coming to [place] tonight from [place], this CM will meet with him in the am. Pt is 

medically ready for d/c.



D/c plan of care: [facility] inpatient rehab if insurance approves

 

Date Signed:  02/14/2019 02:49 PM

Electronically Signed By:STEPHANIE Rivera

## 2019-02-15 ENCOUNTER — HOSPITAL ENCOUNTER (INPATIENT)
Dept: HOSPITAL 80 - BREH | Age: 24
LOS: 1 days | Discharge: ANOTHER HOSPITAL | DRG: 560 | End: 2019-02-16
Attending: INTERNAL MEDICINE | Admitting: INTERNAL MEDICINE
Payer: COMMERCIAL

## 2019-02-15 VITALS — DIASTOLIC BLOOD PRESSURE: 63 MMHG | SYSTOLIC BLOOD PRESSURE: 133 MMHG

## 2019-02-15 DIAGNOSIS — S52.022D: ICD-10-CM

## 2019-02-15 DIAGNOSIS — S72.352D: ICD-10-CM

## 2019-02-15 DIAGNOSIS — S72.142D: Primary | ICD-10-CM
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
## 2019-02-15 DIAGNOSIS — T50.7X5A: ICD-10-CM

## 2019-02-15 DIAGNOSIS — K59.03: ICD-10-CM
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
## 2019-02-15 DIAGNOSIS — I95.1: ICD-10-CM

## 2019-02-15 DIAGNOSIS — R09.02: ICD-10-CM

## 2019-02-15 DIAGNOSIS — D64.9: ICD-10-CM

## 2019-02-15 DIAGNOSIS — E87.1: ICD-10-CM

## 2019-02-15 DIAGNOSIS — R91.1: ICD-10-CM

## 2019-02-15 DIAGNOSIS — R00.0: ICD-10-CM

## 2019-02-15 RX ADMIN — POLYETHYLENE GLYCOL 3350 SCH: 17 POWDER, FOR SOLUTION ORAL at 11:20

## 2019-02-15 RX ADMIN — ACETAMINOPHEN SCH MG: 500 TABLET ORAL at 21:21

## 2019-02-15 RX ADMIN — HYDROMORPHONE HYDROCHLORIDE PRN MG: 2 TABLET ORAL at 08:40

## 2019-02-15 RX ADMIN — RIVAROXABAN SCH MG: 10 TABLET, FILM COATED ORAL at 08:40

## 2019-02-15 RX ADMIN — STANDARDIZED SENNA CONCENTRATE SCH TAB: 8.6 TABLET ORAL at 21:22

## 2019-02-15 RX ADMIN — ACETAMINOPHEN SCH MG: 500 TABLET ORAL at 06:12

## 2019-02-15 RX ADMIN — DOCUSATE SODIUM AND SENNOSIDES SCH TAB: 50; 8.6 TABLET ORAL at 08:39

## 2019-02-15 NOTE — GHP
[f rep st]



                                                            HISTORY AND PHYSICAL





DATE OF ADMISSION:  02/15/2019



TIME OF EVALUATION:  1545 hours.



REFERRING FACILITY:  [facility].



REFERRING PHYSICIAN:  Adry Taylor MD



IMPAIRMENT GROUP:  8.4.



DATE OF ONSET:  02/10/2019.



TYPE OF REPORT:  Post Admission Physician Evaluation and Rehabilitation Treatment Plan



CONSULTING PHYSICIAN:  He was seen in consultation by the hospitalist service, Dr. Dawson, and by the
 trauma service, Dr. Paul.



REHABILITATION DIAGNOSIS:  Multiple fractures.



ETIOLOGIC DIAGNOSIS:  Major multiple fractures.



DATE OF SURGERY:  02/11/2019.



HISTORY OF PRESENT ILLNESS:  This patient had a snowboard accident, in which he hit a tree.  He was h
elmeted and had no loss of consciousness.  He suffered a left comminuted olecranon fracture, a left i
ntertrochanteric hip fracture, and a left midshaft femur fracture.  He had surgery on 02/11/2019, KJ
F of the left olecranon and a left trochanteric femoral nail placement.  Postoperatively, he had tach
ycardia for which the hospitalist service was consulted.  A CT angiogram of his chest ruled out pulmo
nary embolus, but there was an incidental finding of a 4 x 3.7 mm non-calcified nodule in the periphe
ry of the right major fissure.  There was an echocardiogram done also to evaluate tachycardia.  Eject
ion fraction was 60%.  He had normal global systolic and diastolic left ventricular function.  He had
 normal right ventricular function, normal atria, and no valvular stenosis or regurgitation.  He had 
anemia, which likely contributed to the tachycardia, but he did not meet criteria for transfusion.  LIZET lombardi had hyponatremia.  He was otherwise medically stable and appropriate for inpatient rehabilitation.



OTHER STUDIES AND LABS DURING STAY:  On the day of discharge, hemoglobin and hematocrit were 7.7 and 
21.6.  These were essentially stable from the day before and improved from 02/13/2019.  Coagulation s
tudies revealed an elevated D-dimer at 4.1.  Serum chemistry showed hyponatremia at 129 on 02/11/2019
. This improved to 134 on 02/14/2019.  Renal function and electrolytes were otherwise overall within 
normal limits.  TSH was normal at 0.787.  Urine osmolality was low at 164.  Urine random sodium was 3
7.



PRECAUTIONS:  He has orthopedic precautions of nonweightbearing to the left upper extremity, though h
e may use a platform walker.



ACTIVE COMORBIDITIES:  He has no active tier 1, tier 2, or tier 3 comorbidities.



PAST MEDICAL HISTORY:  He has no history of medical illnesses.



PAST SURGICAL HISTORY:  He has not had previous surgeries.



MEDICATIONS:  He was not taking any medications prior to this hospitalization.



ALLERGIES:  There are no known drug allergies.



PSYCHOSOCIAL HISTORY:  He lives with several roommates.  He has a girlfriend.  He works in the eco-te
ch industry.  He is a nonsmoker.



FAMILY HISTORY:  Noncontributory.



REVIEW OF SYSTEMS:  He reports he has some pain in the left leg.  He thinks it may hurt more when he 
stands up.  He has had constipation, but thinks he had a bowel movement today.  He has no cough or dy
spnea.  He has no fevers or chills.  He has no dysuria or urinary frequency.  Otherwise, a 10-point r
eview of systems is negative.



PHYSICAL EXAMINATION:  VITAL SIGNS:  Vitals are not yet available in the chart.  This morning in the 
hospital, his blood pressure was 133/63, his heart rate was 108, his respiratory rate was 17, oxygen 
saturation was 99% on 1.5 L, and temperature was 36.6 degrees centigrade.  His weight is 65.8 kg for 
a body mass index of 20.8.  GENERAL:  This is a well-nourished, well-developed man, who appears his c
hronologic age, dressed in street clothes, lying in bed, left arm in a sling, cooperative, and in no 
acute distress.  HEENT:  Extraocular movements are intact.  Pupils are equal, round, and reactive to 
light.  Mucous membranes are moist.  Dentition is in good condition.  NECK:  Supple.  HEART:  There i
s a regular rate and rhythm with no gallops.  LUNGS:  Clear to auscultation bilaterally.  ABDOMEN:  S
oft, nontender, and nondistended with normoactive bowel sounds and no hepatosplenomegaly.  EXTREMITIE
S:  There is no cyanosis, clubbing, or edema.  Left arm is in a sling.  NEUROLOGIC:  He is alert and 
oriented x3.  Cranial nerves 2 through 12 are grossly intact.  There is no focal weakness, and sensat
ion is intact to light touch.



CURRENT LEVEL OF FUNCTION:  Per the preadmission screen, he was on a regular diet and needed no aramis
tance for eating.  Grooming was done with setup and minimal assist with voice cues or one-handed tech
nique.  Dressing for upper body required maximal assist with voice cues, and lower body required maxi
mal assist with voice cues.  He was continent of bladder and bowel.  Needed moderate to maximal aramis
t for bed mobility, particularly for moving the left lower extremity.  Transfers were done with minim
al assist and voice cues.  Sitting balance required standby assistance.  Standing balance required mi
nimal assist with voice cues.  Endurance was poor to fair, and there was a comment that function was 
impacted by pain. 



On today's exam, there is no significant change from the preadmission screen.



IMPRESSION:  This is a 23-year-old man, who had a snow board versus tree collision, which resulted in
 a left olecranon fracture, and a left femur intertrochanteric and femoral shaft fracture.  He had villalobos
rgery with open reduction internal fixation to the olecranon and femoral intramedullary nailing.  He 
came through surgery well, but he has anemia and tachycardia.  As part of the workup for tachycardia,
 there was a normal CT angiogram, but an incidental finding of a 0.5 cm pulmonary nodule.  He has had
 issues with pain control and constipation.  He has hyponatremia, which does not appear to be consist
ent by ADH with relatively dilute urine.  His intravenous fluids were changed during his stay from D5
 normal saline to normal saline, with improvement in the hyponatremia. 



His goal is to complete a rehabilitation stay, and then return home with his friends and supportive s
ervices.  For a safe discharge, he will need to achieve modified independence for eating, grooming, d
ressing, bed mobility, toileting, and transferring.  He will need to be able to ambulate for short di
stances with a left platform walker.  He will need to be able to negotiate a flight of stairs to acce
ss his bedroom.  He may require a wheelchair for community distances.  He may require supervision or 
assistance for bathing.  He will need to be able to maintain all weightbearing restrictions. 



He will have therapy with Physical Therapy and Occupational Therapy for 90 minutes per day for each d
iscipline on 5 to 7 days per week.  His expected duration of stay is 5 to 7 days. 



It is anticipated that upon discharge, he will benefit from all services including nursing, occupatio
nal therapy, and physical therapy.



PLAN:  

1.  Debility with nonweightbearing on the left upper extremity status post multiple fractures sustain
ed in a snowboarder versus tree collision.  PT and OT to optimize mobility, and activities of daily l
iving using a platform walker.

2.  Pain management.  We will continue medications as ordered out of the hospital.  He is on schedule
d acetaminophen 1000 mg q.8 h. and tramadol 50 mg q.6 h.  Additionally, he has hydromorphone 2 to 4 m
g every 4 hours on an as-needed basis.  He will be assessed for adequacy of pain management, and medi
cations can be adjusted as needed.

3.  Tachycardia, with pulmonary embolus ruled out and a normal echocardiogram.  Possibly this is due 
to the recent stresses of his injuries, as well as __________ will observe for this to improve during
 the course of his rehabilitation stay.

4.  Hypoxia, likely related to anemia.  Continue oxygen.  He will have incentive spirometry, with a g
oal of discontinuing the need for oxygen during his rehabilitation stay.

5.  Opiate-induced constipation.  I have ordered senna 1 tablet twice daily and bisacodyl suppository
 daily as needed.  He will be assessed for normal bowel movements and medications adjusted.

6.  Hyponatremia perhaps was due to overly dilute IV fluids.  It improved with normal saline.  We chiqui
l recheck a basic metabolic profile in __________.

7.  Anemia, postsurgical, and likely due to fractures.  Repeat CBC.

8.  Prophylaxis.  Per Orthopedic Surgery, he will continue rivaroxaban for 10 days postoperatively, w
[facility] would be through 02/21/2019.  He is not at excessive risk for GI ulceration, so no prophylaxis i
s necessary.

9.  Followup.  He is to follow up with orthopedic surgeon, Dr. Taylor, at [facility]
s 14 days postop, which would be February 25, 2019.  At that time, there will be a check, repeat radi
ographs, and removal of left elbow splint.

10.  Wound care.  Dressings were changed in the hospital on the day of discharge.  There are no speci
fic orders regarding wound care.  We will change dressings on a p.r.n. basis.





Job #:  [id number]

## 2019-02-15 NOTE — PDOREHIP
Admission [facility]





- Admission - 3 Day Assessment Period


Admission Date/Day 1: 02/15/19


Day 2: 02/16/19


Day 3: 02/17/19





- Active Diagnoses


Comorbidities and Co-existing Conditions at Admission: 79112. None of the Above





- Skin Conditions


Unhealed Pressure Ulcer (1 or more/Stage 1 or >)-Admission: 0. No


# Stage 1 Pressure Ulcers-Admission: 0


# Stage 2 Pressure Ulcers-Admission: 0


# Stage 3 Pressure Ulcers-Admission: 0


# Stage 4 Pressure Ulcers-Admission: 0


# Unstageable Pressure Ulcers (Non-remove Dress)-Admission: 0


# Unstageable Pressure Ulcers (Slough/Eschar)-Admission: 0


# Unstageable Pressure Ulcers (Deep Tissue Injury)-Admission: 0

## 2019-02-15 NOTE — ASMTCMCOM
CM Note

 

CM Note                       

Notes:

Pt medically stable for d/c to [facility] inpatient rehab, the insurance has authorized the rehab. Orders 

to be obtained via [employer]. 



RAJEEV April to call report.  transport arranged with [facility] for 1500, pt/father aware of 

payment requirement. 

 

Date Signed:  02/15/2019 12:23 PM

Electronically Signed By:STEPHANIE Rivera

## 2019-02-15 NOTE — HOSPPROG
Hospitalist Progress Note


Assessment/Plan: 








David is a 24 y/o male who had a skiing accident at [place] on 2/10/2019.  He 

sustained a left elbow and left femur fx.  He has had surgery w orthopedics, 

and trauma is following.  Hospitalist were asked to see him in regards to 

tachycardia. CTA is negative for a PE, but has non specific nodules that should 

get f/u.  





*Tachycardia


-CTA shows no PE


-resolved





*anemia/acute blood loss


-hold transfusion-not symptomatic





*acute hypoxemia


-now on room air





*had an episode of lightheadedness this morning


-will check an h and h now





*low grade fever


-none further


*Leukocytosis


-resolved





*hyponatremia


-much improved w hydration





*Left olecranon fx and femur fx


-care per orthopedics





*dvt prophylaxis: Xarelto





*non specific 4 x 3 mm noncalcified nodule at the periphery of r major fissure  





*constipation


-bowel protocol





*plan: IP rehab evaluating for placement, dc per orthopedics 





Subjective: David is feeling well this morning, is lightheaded.


Objective: 


 Vital Signs











Temp Pulse Resp BP Pulse Ox


 


 36.4 C   95   16   115/67   93 


 


 02/15/19 08:11  02/15/19 08:11  02/15/19 08:11  02/15/19 08:11  02/15/19 08:11








 Laboratory Results





 02/14/19 05:05 





 02/14/19 05:00 





 











 02/14/19 02/15/19 02/16/19





 05:59 05:59 05:59


 


Intake Total 1700  500


 


Output Total 3125 1900 450


 


Balance -1425 -1900 50














- Physical Exam


Constitutional: uncomfortable


Eyes: PERRL


Ears, Nose, Mouth, Throat: hearing normal


Cardiovascular: regular rate and rhythym


Respiratory: no respiratory distress, reduced air movement


Skin: warm, No normal color (pale)


Neurologic: AAOx3


Psychiatric: interacting appropriately, flat affect





ICD10 Worksheet


Patient Problems: 


 Problems











Problem Status Onset


 


Femur fracture, left Acute  














- ICD10 Problem Qualifiers


(1) Femur fracture, left

## 2019-02-15 NOTE — PDIAF
- Diagnosis


Diagnosis: Left femur fracture, Left olecranon fracture


Code Status: Full Code





- Medication Management


Discharge Medications: electronically signed and located in the Home Medication 

List.





- Orders


Services needed: Physical Therapy, Occupational Therapy


Diet Recommendation: no restrictions on diet


Diet Texture: Regular Texture Diet


Additional Instructions: 


WBAT LLE. NWB LUE. Continue PT/OT for gait training, strengthening, ROM. 

Dressings on left leg may be removed 5 days post op. Incisions may get wet, 

however no submerging. Continue left elbow splint. Continue ROM of the wrist 

and digits. Xarelto 10mg qd x 10 days post op. Follow up at [facility] for 

orthopedics 14 days post op for wound check and repeat radiographs as well as 

removal of left elbow splint. ([phone])





- Follow Up Care


Current Providers and Referrals: 


Patient,NotPresent [Unknown] - As per Instructions

## 2019-02-15 NOTE — ASMTLACE
LACE

 

Length of stay for            Answers:  4-6 days                              

current admission                                                             

Acuity / Level of             Answers:  Yes                                   

Care: Did the patient                                                         

have an inpatient                                                             

admission?                                                                    

# of Emergency department     Answers:  1-2                                   

visits in the last 6                                                          

months                                                                        

Score: 8

 

Date Signed:  02/15/2019 12:20 PM

Electronically Signed By:STEPHANIE Rivera

## 2019-02-16 ENCOUNTER — HOSPITAL ENCOUNTER (OUTPATIENT)
Dept: HOSPITAL 80 - FED | Age: 24
Setting detail: OBSERVATION
LOS: 2 days | Discharge: NURSING, REHAB OR LONG-TERM CARE | End: 2019-02-18
Attending: SURGERY | Admitting: SURGERY
Payer: COMMERCIAL

## 2019-02-16 VITALS — SYSTOLIC BLOOD PRESSURE: 97 MMHG | DIASTOLIC BLOOD PRESSURE: 79 MMHG

## 2019-02-16 DIAGNOSIS — I10: ICD-10-CM

## 2019-02-16 DIAGNOSIS — Z23: ICD-10-CM

## 2019-02-16 DIAGNOSIS — R00.0: Primary | ICD-10-CM

## 2019-02-16 LAB
INR PPP: 1.37 (ref 0.83–1.16)
PLATELET # BLD: 298 10^3/UL (ref 150–400)
PLATELET # BLD: 304 10^3/UL (ref 150–400)
PLATELET # BLD: 354 10^3/UL (ref 150–400)
PROTHROMBIN TIME: 17 SEC (ref 12–15)

## 2019-02-16 PROCEDURE — G0378 HOSPITAL OBSERVATION PER HR: HCPCS

## 2019-02-16 PROCEDURE — 93970 EXTREMITY STUDY: CPT

## 2019-02-16 PROCEDURE — 71275 CT ANGIOGRAPHY CHEST: CPT

## 2019-02-16 PROCEDURE — 90471 IMMUNIZATION ADMIN: CPT

## 2019-02-16 PROCEDURE — 36430 TRANSFUSION BLD/BLD COMPNT: CPT

## 2019-02-16 PROCEDURE — 71045 X-RAY EXAM CHEST 1 VIEW: CPT

## 2019-02-16 PROCEDURE — 99285 EMERGENCY DEPT VISIT HI MDM: CPT

## 2019-02-16 PROCEDURE — 97162 PT EVAL MOD COMPLEX 30 MIN: CPT

## 2019-02-16 PROCEDURE — 96361 HYDRATE IV INFUSION ADD-ON: CPT

## 2019-02-16 PROCEDURE — P9016 RBC LEUKOCYTES REDUCED: HCPCS

## 2019-02-16 PROCEDURE — 97116 GAIT TRAINING THERAPY: CPT

## 2019-02-16 PROCEDURE — 29105 APPLICATION LONG ARM SPLINT: CPT

## 2019-02-16 PROCEDURE — 30233N1 TRANSFUSION OF NONAUTOLOGOUS RED BLOOD CELLS INTO PERIPHERAL VEIN, PERCUTANEOUS APPROACH: ICD-10-PCS

## 2019-02-16 PROCEDURE — A4565 SLINGS: HCPCS

## 2019-02-16 PROCEDURE — 97166 OT EVAL MOD COMPLEX 45 MIN: CPT

## 2019-02-16 PROCEDURE — 2W39X1Z IMMOBILIZATION OF LEFT UPPER EXTREMITY USING SPLINT: ICD-10-PCS

## 2019-02-16 PROCEDURE — G0008 ADMIN INFLUENZA VIRUS VAC: HCPCS

## 2019-02-16 PROCEDURE — 97530 THERAPEUTIC ACTIVITIES: CPT

## 2019-02-16 RX ADMIN — STANDARDIZED SENNA CONCENTRATE SCH TAB: 8.6 TABLET ORAL at 09:02

## 2019-02-16 RX ADMIN — ACETAMINOPHEN SCH MG: 500 TABLET ORAL at 14:17

## 2019-02-16 RX ADMIN — ACETAMINOPHEN SCH MG: 500 TABLET ORAL at 06:03

## 2019-02-16 NOTE — SOAPPROG
SOAP Progress Note


Assessment/Plan: 


Assessment:





DEBILITY WITH NONWEIGHTBEARING ON LEFT UPPER EXTREMITY STATUS POST ORIF LEFT 

OLECRANON FRACTURE.  STATUS POST INTRAMEDULLARY NAILING LEFT FEMUR.  

WEIGHTBEARING AS TOLERATED LEFT LOWER EXTREMITY.  PHYSICAL AND OCCUPATIONAL 

THERAPY CONSULTED TO OPTIMIZE MOBILITY, GAIT TRAINING, ACTIVITIES OF DAILY 

LIVING.  USING PLATFORM WALKER.





PAIN MANAGEMENT.  HE REPORTS GOOD PAIN MANAGEMENT.  NO PROBLEMS REPORTED BY 

NURSING REGARDING THIS.  HE IS ON SCHEDULED ACETAMINOPHEN 1000 MG Q.8 HOURS AND 

TRAMADOL 50 MG Q 6. ADDITIONALLY HE HAS HYDROMORPHONE 2-4 MG EVERY 4 HR ON AS-

NEEDED BASIS.  WILL CONTINUE TO MONITOR PAIN LEVELS.  WILL ADD A MUSCLE 

RELAXANT P.R.N. NIGHTTIME SINCE HE IS COMPLAINING OF INCREASED BACK PAIN AT 

NIGHT DUE TO POSTURAL ISSUES SECONDARY TO FRACTURES.





TACHYCARDIA.  PULMONARY EMBOLUS RULED OUT AND NORMAL ECHOCARDIOGRAM.  POSSIBLY 

THIS IS DUE TO RECENT STRESSES OF HIS INJURIES AS WELL AS POSSIBLE ANEMIA.  

NURSING REPORTS THAT HE WAS ORTHOSTATIC THIS MORNING, 2/16, WITH SEATED BLOOD 

PRESSURE 124/76, STANDING BLOOD PRESSURE 91/60, SEATED HEART RATE 116, STANDING 

HEART RATE 150. THEREFORE HAVE ASKED NURSING TO APPLY EDDA HOSE.  IF THIGH-HIGH 

NOT AVAILABLE FOR RIGHT LOWER EXTREMITY THAN OKAY TO USE KNEE-HIGH.  ALSO PLACE 

ABDOMINAL BINDER.  ENCOURAGE P.O. INTAKE WHICH WAS DISCUSSED WITH PATIENT.  

HEMOGLOBIN AND HEMATOCRIT FROM 02/16 7.4/21.9





HYPOXIA, LIKELY RELATED TO ANEMIA.  CONTINUE INCENTIVE SPIROMETRY.  PATIENT 

CURRENTLY NOT USING OXYGEN.





OPIATE INDUCED CONSTIPATION.  SENNA 1 TABLET TWICE DAILY AND THUS A CAUDAL 

SUPPOSITORY DAILY AS NEEDED.  NURSING TO CONTINUE TO ASSESS FOR NORMAL BOWEL 

MOVEMENTS WITH APPROPRIATE ADJUSTMENT OF MEDICATIONS.





HYPONATREMIA.  POSSIBLY RELATED TO IV FLUIDS DURING ACUTE INPATIENT STAY.  THIS 

IMPROVED WITH NORMAL SALINE.  BMP FROM 02/16 SODIUM 135, POTASSIUM 4.2.  BUN 14

, CREATININE 0.6.





ANEMIA, POSTSURGICAL AND LIKELY DUE TO FRACTURES.  CBC FROM 2/16 HEMOGLOBIN 

7.4.  HEMATOCRIT 21.9.  WILL CONTINUE TO MONITOR.  RECHECK CBC EARLY NEXT WEEK.





PROPHYLAXIS.  PER ORTHOPEDIC SURGERY HE WILL CONTINUE RIVAROXABAN FOR 10 DAYS 

POSTOPERATIVELY WHICH WOULD BE THROUGH 02/21/2019.  HE IS NOT AN EXCESSIVE RISK 

FOR GI ULCERATION, SO NO PROPHYLAXIS IS NECESSARY.





FOLLOW-UP.  HE IS TO FOLLOW UP WITH ORTHOPEDIC SURGEON, DR. BENOIT, AT [facility] 14 DAYS POSTOP WHICH WOULD BE FEBRUARY 25TH.  AT THAT 

TIME THERE WILL BE A CHECK, REPEAT RADIOGRAPHS AND REMOVAL OF LEFT ELBOW SPLINT.





WOUND CARE.  DRESSINGS WERE CHANGED IN THE HOSPITAL ON THE DAY OF DISCHARGE.  

THERE ARE NO SPECIFIC ORDERS REGARDING WOUND CARE.  WE WILL CHANGE DRESSINGS ON 

A P.R.N. BASIS.




















Plan:





02/16/19 10:08





02/16/19 10:17





02/16/19 10:19





Subjective: 





HE REPORTS HIS PAIN IS WELL CONTROLLED.  HE DOES NOT DESCRIBE MUCH IN THE WAY 

OF LEFT ELBOW OR LEFT THIGH PAIN.  HE DOES REPORT PAIN IN THE LEFT SUBACROMIAL 

REGION WITH SHOULDER ABDUCTION WHICH HE ACHIEVES PASSIVELY BY USING THE RIGHT 

HAND TO ASSIST.  HE DOES REPORT SOME WEAKNESS ABOUT THE LEFT SHOULDER GIRDLE.  

NURSING REPORTS HE IS ORTHOSTATIC.  NURSING ALSO REPORTS THAT HIS PAIN IS WELL 

CONTROLLED.  HE REPORTS THAT HE HAS BACK PAIN AT NIGHT SECONDARY TO POSTURE 

WHILE SLEEPING.


Objective: 





 Vital Signs











Temp Pulse Resp BP Pulse Ox


 


 36.9 C   16 L  12   124/76 H  94 


 


 02/16/19 06:36  02/16/19 08:51  02/16/19 06:36  02/16/19 08:51  02/16/19 06:36








 Laboratory Results





 02/16/19 06:10 





 02/16/19 06:10 





 











 02/15/19 02/16/19 02/17/19





 05:59 05:59 05:59


 


Intake Total  1100 


 


Output Total  1025 700


 


Balance  75 -700














Physical Exam





- Physical Exam


General Appearance: WD/WN, alert, no apparent distress


Neck: full range of motion, supple


Respiratory: lungs clear, normal breath sounds


Abdomen: normal bowel sounds, non-tender, soft


Skin: warm/dry (LEFT UPPER EXTREMITY), other (LEFT HAND WITHOUT DISCOLORATION.)


Extremities: swelling, Nataliya's sign


Neuro/Psych: motor weakness (DIFFICULTY PERFORMING LEFT SHOULDER ACTIVE 

ABDUCTION WITHOUT ASSISTANCE), No sensory deficit (NO SENSORY DEFICITS LEFT 

UPPER EXTREMITY)





ICD10 Worksheet


Patient Problems: 


 Problems











Problem Status Onset


 


Femur fracture, left Acute

## 2019-02-16 NOTE — EDPHY
H & P


Time Seen by Provider: 02/16/19 19:22


HPI/ROS: 





Chief complaint.  Possible sepsis





HPI.  Patient is a 23-year-old male was seen in our emergency department 

several days ago for major trauma after hitting a tree while snowboarding.  He 

had surgery to left elbow left hip and left femur.  He was then transferred to 

rehab facility.  Tonight the patient has had tachycardia and some hypertension 

and so was referred to the emergency department for concern about sepsis.  

There had been no fever.  Patient is taking Xarelto.  Denies chest pain 

shortness of breath.  No cough.  No upper respiratory symptoms.  No abdominal 

pain or vomiting or diarrhea.  Denies urinary symptoms.





ROS


10 systems were reviewed and negative with the exception of the elements 

mentioned in the history of present illness





Past Medical/Surgical History: 





Healthy with recent trauma and surgery


Social History: 





Single, nonsmoker, no alcohol


Smoking Status: Never smoked


Physical Exam: 





General Appearance:  Alert well-developed male mild distress vital signs show 

temp 37.6 degrees with heart rate 122.  Stable blood pressure 134/7


Eyes: Pupils equal and round no pallor or injection.


ENT, pharynx without injection


Respiratory:  There are no retractions, lungs are clear to auscultation.


Cardiovascular: Regular rate and rhythm.


Gastrointestinal:   Abdomen is soft and nontender, no masses, bowel sounds 

normal.


Neurological:  Awake and alert, sensory and motor exams grossly normal.


Skin:  No evidence for postoperative wound infection


Musculoskeletal:  Neck is supple nontender.


Extremities swelling of left lower extremity


Psychiatric: Patient is oriented X 3, there is no agitation.


Constitutional: 


 Initial Vital Signs











Temperature (C)  37.6 C   02/16/19 19:23


 


Heart Rate  122 H  02/16/19 19:23


 


Respiratory Rate  18   02/16/19 19:23


 


Blood Pressure  134/70 H  02/16/19 19:23


 


O2 Sat (%)  96   02/16/19 19:23








 











O2 Delivery Mode               Room Air














Allergies/Adverse Reactions: 


 





No Known Allergies Allergy (Verified 02/16/19 19:23)


 








Home Medications: 














 Medication  Instructions  Recorded


 


Acetaminophen [Tylenol  mg 1,000 mg PO Q8HRS  tab 02/15/19





(*)]  


 


HYDROmorphone HCL [Dilaudid 2 mg 2 - 4 mg PO Q4HRS PRN  tab 02/15/19





(*)]  


 


Rivaroxaban [Xarelto 10mg (*)] 10 mg PO DAILY  tab 02/15/19


 


traMADol [Ultram 50 mg (*)] 50 mg PO Q6HRS  tab 02/15/19


 


Bisacodyl [Dulcolax] 10 mg RC DAILY PRN 02/16/19


 


Methocarbamol [Robaxin 500 mg (*)] 500 mg PO HS PRN 02/16/19


 


Sennosides [Senokot] 8.6 mg PO BID 02/16/19














Medical Decision Making





- Diagnostics


Imaging Results: 


 Imaging Impressions





Chest X-Ray  02/16/19 19:39


Impression: No acute cardiopulmonary abnormality.








Chest/Thorax CTA  02/16/19 20:14


Impression:  There is no CT evidence for pulmonary artery embolic disease.


 


Findings were discussed with ELVIE JENKINS MD at 20:57, on 2/16/2019.


 








Chest x-ray interpreted by me is normal





CT chest shows no evidence for pulmonary embolus


Procedures: 





Patient's splints and dressing are removed and his surgical wounds are 

inspected.  No evidence for cellulitis


ED Course/Re-evaluation: 





Patient has a hematocrit of about 22 and a mild elevation of his white blood 

cell count.  He is typed and screened and then type and cross for 2 units of 

blood.





He remains tachycardic at about 120





I consulted discussed case with Dr. Paul who sees the patient in the 

emergency department





Rectal exam shows heme-positive stool.  Patient is on Xarelto.





Sepsis workup is negative





Patient then spikes a temperature to 38.2.  He is given Tylenol.





I consulted discussed case with Dr. Paul for surgery who sees the patient in 

the emergency department


Differential Diagnosis: 





Patient has a fever but I cannot find a source of infection.  He has no upper 

respiratory symptoms.  No evidence for pneumonia or PE.  No evidence for wound 

infection.  He does have positive Hemoccult.  He is significantly anemic and 

remains tachycardic.  Plan is admission and transfusion.  Blood cultures are 

ordered





- Data Points


Laboratory Results: 


 Laboratory Results





 02/16/19 19:30 





 02/16/19 19:30 





 











  02/16/19 02/16/19 02/16/19





  20:10 19:45 19:30


 


WBC      





    


 


RBC      





    


 


Hgb      





    


 


Hct      





    


 


MCV      





    


 


MCH      





    


 


MCHC      





    


 


RDW      





    


 


Plt Count      





    


 


MPV      





    


 


Neut % (Auto)      





    


 


Lymph % (Auto)      





    


 


Mono % (Auto)      





    


 


Eos % (Auto)      





    


 


Baso % (Auto)      





    


 


Nucleat RBC Rel Count      





    


 


Absolute Neuts (auto)      





    


 


Absolute Lymphs (auto)      





    


 


Absolute Monos (auto)      





    


 


Absolute Eos (auto)      





    


 


Absolute Basos (auto)      





    


 


Absolute Nucleated RBC      





    


 


Immature Gran %      





    


 


Immature Gran #      





    


 


PT      17.0 SEC H SEC





     (12.0-15.0) 


 


INR      1.37  H 





     (0.83-1.16) 


 


APTT      33.8 SEC SEC





     (23.0-38.0) 


 


VBG Lactic Acid      





    


 


Sodium      





    


 


Potassium      





    


 


Chloride      





    


 


Carbon Dioxide      





    


 


Anion Gap      





    


 


BUN      





    


 


Creatinine      





    


 


Estimated GFR      





    


 


Glucose      





    


 


Calcium      





    


 


Urine Color  YELLOW     





    


 


Urine Appearance  CLEAR     





    


 


Urine pH  7.0     





   (5.0-7.5)   


 


Ur Specific Gravity  1.013     





   (1.002-1.030)   


 


Urine Protein  NEGATIVE     





   (NEGATIVE)   


 


Urine Ketones  NEGATIVE     





   (NEGATIVE)   


 


Urine Blood  NEGATIVE     





   (NEGATIVE)   


 


Urine Nitrate  NEGATIVE     





   (NEGATIVE)   


 


Urine Bilirubin  NEGATIVE     





   (NEGATIVE)   


 


Urine Urobilinogen  2.0 EU H EU    





   (0.2-1.0)   


 


Ur Leukocyte Esterase  NEGATIVE     





   (NEGATIVE)   


 


Urine RBC  1-3 /hpf /hpf    





   (0-3)   


 


Urine WBC  1-3 /hpf /hpf    





   (0-3)   


 


Ur Epithelial Cells  NONE SEEN /lpf /lpf    





   (NONE-1+)   


 


Urine Glucose  NEGATIVE     





   (NEGATIVE)   


 


Patient ABO/Rh    O NEGATIVE   





    


 


Antibody Screen    NEGATIVE   





    


 


Crossmatch IS Only    See Detail   





    














  02/16/19 02/16/19 02/16/19





  19:30 19:30 19:30


 


WBC      11.86 10^3/uL H 10^3/uL





     (3.80-9.50) 


 


RBC      2.49 10^6/uL L 10^6/uL





     (4.40-6.38) 


 


Hgb      7.5 g/dL L g/dL





     (13.7-17.5) 


 


Hct      22.1 % L %





     (40.0-51.0) 


 


MCV      88.8 fL fL





     (81.5-99.8) 


 


MCH      30.1 pg pg





     (27.9-34.1) 


 


MCHC      33.9 g/dL g/dL





     (32.4-36.7) 


 


RDW      13.6 % %





     (11.5-15.2) 


 


Plt Count      354 10^3/uL 10^3/uL





     (150-400) 


 


MPV      9.9 fL fL





     (8.7-11.7) 


 


Neut % (Auto)      75.5 % H %





     (39.3-74.2) 


 


Lymph % (Auto)      13.2 % L %





     (15.0-45.0) 


 


Mono % (Auto)      8.3 % %





     (4.5-13.0) 


 


Eos % (Auto)      0.6 % %





     (0.6-7.6) 


 


Baso % (Auto)      0.2 % L %





     (0.3-1.7) 


 


Nucleat RBC Rel Count      0.0 % %





     (0.0-0.2) 


 


Absolute Neuts (auto)      8.95 10^3/uL H 10^3/uL





     (1.70-6.50) 


 


Absolute Lymphs (auto)      1.57 10^3/uL 10^3/uL





     (1.00-3.00) 


 


Absolute Monos (auto)      0.99 10^3/uL H 10^3/uL





     (0.30-0.80) 


 


Absolute Eos (auto)      0.07 10^3/uL 10^3/uL





     (0.03-0.40) 


 


Absolute Basos (auto)      0.02 10^3/uL 10^3/uL





     (0.02-0.10) 


 


Absolute Nucleated RBC      0.00 10^3/uL 10^3/uL





     (0-0.01) 


 


Immature Gran %      2.2 % H %





     (0.0-1.1) 


 


Immature Gran #      0.26 10^3/uL H 10^3/uL





     (0.00-0.10) 


 


PT      





    


 


INR      





    


 


APTT      





    


 


VBG Lactic Acid  1.6 mmol/L mmol/L    





   (0.7-2.1)   


 


Sodium    131 mEq/L L mEq/L  





    (135-145)  


 


Potassium    4.0 mEq/L mEq/L  





    (3.5-5.2)  


 


Chloride    98 mEq/L mEq/L  





    ()  


 


Carbon Dioxide    28 mEq/l mEq/l  





    (22-31)  


 


Anion Gap    5 mEq/L L mEq/L  





    (6-14)  


 


BUN    16 mg/dL mg/dL  





    (7-23)  


 


Creatinine    0.7 mg/dL mg/dL  





    (0.7-1.3)  


 


Estimated GFR    > 60   





    


 


Glucose    101 mg/dL H mg/dL  





    ()  


 


Calcium    8.6 mg/dL mg/dL  





    (8.5-10.4)  


 


Urine Color      





    


 


Urine Appearance      





    


 


Urine pH      





    


 


Ur Specific Gravity      





    


 


Urine Protein      





    


 


Urine Ketones      





    


 


Urine Blood      





    


 


Urine Nitrate      





    


 


Urine Bilirubin      





    


 


Urine Urobilinogen      





    


 


Ur Leukocyte Esterase      





    


 


Urine RBC      





    


 


Urine WBC      





    


 


Ur Epithelial Cells      





    


 


Urine Glucose      





    


 


Patient ABO/Rh      





    


 


Antibody Screen      





    


 


Crossmatch IS Only      





    











Medications Given: 


 








Discontinued Medications





Acetaminophen (Tylenol)  1,000 mg PO EDNOW ONE


   Stop: 02/16/19 21:04


   Last Admin: 02/16/19 21:24 Dose:  1,000 mg


Sodium Chloride (Ns)  1,000 mls @ 0 mls/hr IV ONCE ONE; Wide [facility]


   PRN Reason: Protocol


   Stop: 02/16/19 19:40


   Last Admin: 02/16/19 19:46 Dose:  1,000 mls








Departure





- Departure


Disposition: [facility]


Clinical Impression: 


 Tachycardia





Anemia


Qualifiers:


 Anemia type: other cause Other causes of anemia: acute posthemorrhagic 

Qualified Code(s): D62 - Acute posthemorrhagic anemia





Fever


Qualifiers:


 Fever type: unspecified Qualified Code(s): R50.9 - Fever, unspecified





Condition: Fair

## 2019-02-17 LAB — PLATELET # BLD: 300 10^3/UL (ref 150–400)

## 2019-02-17 RX ADMIN — PANTOPRAZOLE SODIUM SCH MG: 40 INJECTION, POWDER, FOR SOLUTION INTRAVENOUS at 09:20

## 2019-02-17 RX ADMIN — ACETAMINOPHEN SCH MG: 500 TABLET ORAL at 05:47

## 2019-02-17 RX ADMIN — PANTOPRAZOLE SODIUM SCH: 40 INJECTION, POWDER, FOR SOLUTION INTRAVENOUS at 02:15

## 2019-02-17 RX ADMIN — ACETAMINOPHEN SCH MG: 500 TABLET ORAL at 23:07

## 2019-02-17 RX ADMIN — ACETAMINOPHEN SCH MG: 500 TABLET ORAL at 14:03

## 2019-02-17 NOTE — SOAPPROG
SOAP Progress Note


Assessment/Plan: 


Assessment:


























Plan:





02/17/19 10:43


S/P ORIF femur and elbow


tachycardia better with PRBC's


DVT/PE w/u neg


Subjective: 


S/P ORIF olecranon and femur Rt by DR Taylor last Weekend. D/C'd to rehab 

readmitted lasyt pm secondary to tachycardia/anemia


better this am asfter PRBC's


pain controlled with tylenol





Objective: 


splint c/d/i, changed last pm in ED


hand swollen


moving fingers well


sens to LT intact to hand


Femur dressing in place dry


thigh swollen but soft


no calf tenderness


5/5 df/pf


2+ dp/tp pulses


 Vital Signs











Temp Pulse Resp BP Pulse Ox


 


 36.9 C   106 H  14   131/71 H  94 


 


 02/17/19 07:25  02/17/19 07:25  02/17/19 07:25  02/17/19 07:25  02/17/19 07:25








 Laboratory Results





 02/17/19 04:50 





 02/17/19 04:50 





 











 02/16/19 02/17/19 02/18/19





 05:59 05:59 05:59


 


Intake Total  1963 900


 


Output Total  1650 700


 


Balance  313 200








 











PT  17.0 SEC (12.0-15.0)  H  02/16/19  19:30    


 


INR  1.37  (0.83-1.16)  H  02/16/19  19:30    














ICD10 Worksheet


Patient Problems: 


 Problems











Problem Status Onset


 


Anemia Acute  


 


Fever Acute  


 


Tachycardia Acute  


 


Femur fracture, left Acute

## 2019-02-17 NOTE — GHP
[f 
rep st]



                                                            HISTORY AND PHYSICAL





DATE OF ADMISSION:  02/16/2019



ADMITTING DIAGNOSIS:  Tachycardia, history of fat emboli, anemia.



HISTORY:  The patient is a 23-year-old white male who was snowboarding at 
[place].  He his a tree fracturing his femur in 2 sites and fracturing his 
olecranon.  



He was admitted to this hospital and underwent surgery.  Postoperatively, he 
developed tachycardia which was felt to be due to a fat emboli syndrome.  His 
hematocrit stabilized in the 21 range.  He was discharged on the 15th at 3:30 
p.m.  He stayed in bed that evening.  He had dinner.  This morning at 10 o'
clock after physical therapy and after going to the bathroom (where he strained 
but did not produce to stool),  having had tramadol around noon, became 
lightheaded, dizzy and nauseous.  He came to the ER at approximately 7 p.m.  He 
states he has been voiding normally.  He denies bowel movements. 



In the ER, he was tachycardic.  His saturations were in the normal range.  A 
CTA of his chest did not show any evidence of pulmonary embolism.



SOCIAL HISTORY:  He does not smoke.  He has approximately 3 drinks a week.  He 
has no known drug allergies.



MEDICATIONS:  (At rehab) included Tylenol 1000 mg every 8 hours, a Dulcolax 
suppository daily, Senokot 8.6 mg twice a day,  Robaxin ordered at bedtime and 
Dilaudid 2-4 mg q.4 hours p.r.n. pain.  He is also taking Xarelto 10 mg daily 
and tramadol 50 mg every 6 hours.



PAST HISTORY:  He has had no prior other surgeries.  No history of rheumatic 
fever, tuberculosis, hepatitis, transfusions.



REVIEW OF SYSTEMS:  He has no history of concussions.  He does wear contacts 
for visual correction.  There are no limits on his activities.  No history of 
steroid use.



PHYSICAL EXAMINATION:  

GENERAL APPEARANCE:  He is awake and alert.  His left upper extremity splint 
has just been replaced.  

LUNGS:  Clear to auscultation.  Chest stable to AP and lateral compression.  

CARDIAC:  Shows S1, S2 to be normal.  He is tachycardic to 125.  

ABDOMEN:  Soft, nontender.  

EXTREMITIES:  Left lower extremity shows no evidence of swelling or ecchymosis.



IMAGING STUDIES:  A duplex scan (preliminary report) does not show any signs of 
deep venous thrombosis.  His stool is grossly negative but guaiac positive.



IMPRESSION:  I do not feel this necessarily represents sepsis at this time, but 
rather anemia from chronic losses.



PLAN:  I will plan a transfusion this evening of 2 units of packed red cells 
and watch his urine output.  If the tachycardia does not resolve, consideration 
will be given to obtaining a cardiac echo to look for cardiomyopathies or 
diastolic dysfunction.  



I will not continue on his Xarelto at this point.  A PPI has been added.





Job #:  [id number]

MTDD

## 2019-02-17 NOTE — PDHOSCONS
History and Physical





- Chief Complaint


recent snowboarding accident





- History of Present Illness


Medicine consultation at request of Dr. Paul for evaluation and management of 

anemia/tachycardia. 





22 yo m with no prior PMH who was initially admitted to this hospital under the 

trauma service on 2/11/19 status post a snowboarding accident in which he 

struck a tree and suffered femur fracture in 2 places as well as a fractured 

olecranon. He was helmeted and sustained no head injuries. His initial hospital 

stay was complicated by prolonged tachycardia and hypoxia of uncertain 

etiology. He underwent CTA to rule out PE as well as echocardiogram and was 

found to have normal EF and no e/o significant valvular disease. There was 

concern for possible fat emboli syndrome as there was no other clear etiology 

discovered for these ongoing issues. He has had a progressive anemia that was 

thought to be due to bleeding from long bone fractures and has been relatively 

stable since 2/12. He was transferred to IP rehab on 2/15 and on the evening of 

2/16 concerns were raised for tachycardia and hypotension at rehab, and he was 

sent to ER at [facility] for evaluation for possible sepsis. 


In discussion with the patient, he has essentially been feeling better and 

better since the initial accident. He has not noticed his heart beating fast, 

except for a couple of times in the hospital initially when his HR got up to 

the 160s. He has not had chest pain or sob, he has not had near syncope. He has 

not had fever or required oxygen since leaving the hospital. He does not ever 

look at his stools, but his father and the nurses have and they reported he had 

brown/green stool and no e/o red stool or black stools. 





History Information





- Allergies/Home Medication List


Allergies/Adverse Reactions: 








No Known Allergies Allergy (Verified 02/16/19 19:23)


 





Home Medications: 








Bisacodyl [Dulcolax] 10 mg RC DAILY PRN 02/16/19 [Last Taken Unknown]


Methocarbamol [Robaxin 500 mg (*)] 500 mg PO HS PRN 02/16/19 [Last Taken Unknown

]


Sennosides [Senokot] 8.6 mg PO BID 02/16/19 [Last Taken 02/16/19 09:00]





I have personally reviewed and updated: family history, medical history, social 

history, surgical history





- Past Medical History


no pertinent PMH





- Surgical History


Additional surgical history: left olecranon ORIF.  left femur TFN





- Family History


Positive for: non-pertinent





- Social History


Smoking Status: Never smoked


Alcohol Use: Rarely


Drug Use: None





Review of Systems


Review of Systems: 





ROS: 10pt was reviewed & negative except for what was stated in HPI & below





Physical Exam


Physical Exam: 

















Temp Pulse Resp BP Pulse Ox


 


 36.9 C   101 H  12   130/71 H  98 


 


 02/17/19 12:00  02/17/19 12:00  02/17/19 12:00  02/17/19 12:00  02/17/19 12:00




















O2 (L/minute)                  0














Constitutional: no apparent distress, appears nourished


Eyes: PERRL, anicteric sclera


Ears, Nose, Mouth, Throat: moist mucous membranes, hearing normal


Cardiovascular: no murmur, rub, or gallop, tachycardia, No edema


Respiratory: no respiratory distress, no rales or rhonchi, clear to auscultation


Gastrointestinal: normoactive bowel sounds, soft, non-tender abdomen


Genitourinary: no bladder tenderness


Skin: warm, normal color


Musculoskeletal: full muscle strength


Neurologic: AAOx3


Psychiatric: interacting appropriately, not anxious, not encephalopathic





Lab Data & Imaging Review





 02/17/19 04:50





 02/17/19 04:50














WBC  9.98 10^3/uL (3.80-9.50)  H  02/17/19  04:50    


 


RBC  3.04 10^6/uL (4.40-6.38)  L  02/17/19  04:50    


 


Hgb  9.0 g/dL (13.7-17.5)  L  02/17/19  04:50    


 


Hct  26.3 % (40.0-51.0)  L  02/17/19  04:50    


 


MCV  86.5 fL (81.5-99.8)   02/17/19  04:50    


 


MCH  29.6 pg (27.9-34.1)   02/17/19  04:50    


 


MCHC  34.2 g/dL (32.4-36.7)   02/17/19  04:50    


 


RDW  14.3 % (11.5-15.2)   02/17/19  04:50    


 


Plt Count  300 10^3/uL (150-400)   02/17/19  04:50    


 


MPV  9.6 fL (8.7-11.7)   02/17/19  04:50    


 


Neut % (Auto)  71.4 % (39.3-74.2)   02/17/19  04:50    


 


Lymph % (Auto)  15.9 % (15.0-45.0)   02/17/19  04:50    


 


Mono % (Auto)  9.2 % (4.5-13.0)   02/17/19  04:50    


 


Eos % (Auto)  0.6 % (0.6-7.6)   02/17/19  04:50    


 


Baso % (Auto)  0.3 % (0.3-1.7)   02/17/19  04:50    


 


Nucleat RBC Rel Count  0.0 % (0.0-0.2)   02/17/19  04:50    


 


Absolute Neuts (auto)  7.12 10^3/uL (1.70-6.50)  H  02/17/19  04:50    


 


Absolute Lymphs (auto)  1.59 10^3/uL (1.00-3.00)   02/17/19  04:50    


 


Absolute Monos (auto)  0.92 10^3/uL (0.30-0.80)  H  02/17/19  04:50    


 


Absolute Eos (auto)  0.06 10^3/uL (0.03-0.40)   02/17/19  04:50    


 


Absolute Basos (auto)  0.03 10^3/uL (0.02-0.10)   02/17/19  04:50    


 


Absolute Nucleated RBC  0.00 10^3/uL (0-0.01)   02/17/19  04:50    


 


Immature Gran %  2.6 % (0.0-1.1)  H  02/17/19  04:50    


 


Immature Gran #  0.26 10^3/uL (0.00-0.10)  H  02/17/19  04:50    


 


PT  17.0 SEC (12.0-15.0)  H  02/16/19  19:30    


 


INR  1.37  (0.83-1.16)  H  02/16/19  19:30    


 


APTT  33.8 SEC (23.0-38.0)   02/16/19  19:30    


 


VBG Lactic Acid  1.6 mmol/L (0.7-2.1)   02/16/19  19:30    


 


Sodium  135 mEq/L (135-145)   02/17/19  04:50    


 


Potassium  4.1 mEq/L (3.5-5.2)   02/17/19  04:50    


 


Chloride  105 mEq/L ()   02/17/19  04:50    


 


Carbon Dioxide  24 mEq/l (22-31)   02/17/19  04:50    


 


Anion Gap  6 mEq/L (6-14)   02/17/19  04:50    


 


BUN  11 mg/dL (7-23)   02/17/19  04:50    


 


Creatinine  0.5 mg/dL (0.7-1.3)  L  02/17/19  04:50    


 


Estimated GFR  > 60   02/17/19  04:50    


 


Glucose  104 mg/dL ()  H  02/17/19  04:50    


 


Calcium  8.5 mg/dL (8.5-10.4)   02/17/19  04:50    


 


Urine Color  YELLOW   02/16/19  20:10    


 


Urine Appearance  CLEAR   02/16/19  20:10    


 


Urine pH  7.0  (5.0-7.5)   02/16/19  20:10    


 


Ur Specific Gravity  1.013  (1.002-1.030)   02/16/19  20:10    


 


Urine Protein  NEGATIVE  (NEGATIVE)   02/16/19  20:10    


 


Urine Ketones  NEGATIVE  (NEGATIVE)   02/16/19  20:10    


 


Urine Blood  NEGATIVE  (NEGATIVE)   02/16/19  20:10    


 


Urine Nitrate  NEGATIVE  (NEGATIVE)   02/16/19  20:10    


 


Urine Bilirubin  NEGATIVE  (NEGATIVE)   02/16/19  20:10    


 


Urine Urobilinogen  2.0 EU (0.2-1.0)  H  02/16/19  20:10    


 


Ur Leukocyte Esterase  NEGATIVE  (NEGATIVE)   02/16/19  20:10    


 


Urine RBC  1-3 /hpf (0-3)   02/16/19  20:10    


 


Urine WBC  1-3 /hpf (0-3)   02/16/19  20:10    


 


Ur Epithelial Cells  NONE SEEN /lpf (NONE-1+)   02/16/19  20:10    


 


Urine Glucose  NEGATIVE  (NEGATIVE)   02/16/19  20:10    


 


Stool Occult Bld Scrn  POSITIVE  (NEGATIVE)  H  02/16/19  21:30    


 


Patient ABO/Rh  O NEGATIVE   02/16/19  19:45    


 


Antibody Screen  NEGATIVE   02/16/19  19:45    


 


Crossmatch IS Only  See Detail   02/16/19  19:45    








Visualized and Interpreted Chest x-ray results: Yes


Chest X-Ray results: no infiltrate


Visualized and Interpreted imaging results: Yes


Interpretation: CTA: no PE


Visualized and Interpreted EKG results: Yes


EKG additional interpertation: sinus tachycardia





Assessment & Plan


Assessment: 








Tachycardia (Acute)


Anemia (Acute)


Fever (Acute)





22 yo M s/p trauma with femur fracture x 2 as well as olecranon fracture 

currently in IP rehab pw tachycardia and ongoing anemia





# tachycardia: overall this is improving since his hospitalization but remains 

in the low 100s, sinus tach. No PE on repeat CTA, echo was wnl several days ago 

and nothing on exam to suggest CHF. At this point suspect this is simply 

related to his trauma/pain and perhaps anxiety. Unclear why there is some 

lability to his heart rate in that it was in the 90s several days ago and now 

trending back up to low 100s, perhaps some volume depletion contributing as he 

had some lab abnormalities suggestive of volume depletion on arrival. Will 

continue to monitor. 


# anemia: suspect acute blood loss anemia in the setting of significant long 

bone fractures. He has had a relatively stable but low h/h since 2/12. 

Transfused overnight and h/h increased appropriately. Will recheck in am. 


# + occult blood in stool: without overt signs of GI bleeding currently, 

suspect this is more likely due to internal hemorrhoid with recent prolonged 

constipation, if overt bleeding develops or h/h begins to decline would 

consider GI consult for endoscopic evaluation


# ? fat emboli syndrome: may be contributing to ongoing tachycardia


# femur/olecranon fractures: s/p surgical repair, continue pt/ot


# dvt ppx: resume xarelto when cleared by surgery


# observation status--likely could return to rehab when surgical concerns are 

cleared


Patient new to my care. Old records reviewed and summarized as above. Care plan 

reviewed with DR. Paul as above.

## 2019-02-17 NOTE — SOAPPROG
SOAP Progress Note


Assessment/Plan: 


02/17/19 08:16





Re-admit day #1





Assessment:


Pain control is adequate. He has received two units PRDC, hct up to 26, SATs 

good, he has had a good urine output but remains tachycardic. 





Had a brown/green stool this AM- no blood seen.





Plan:





Consult hospitalist for cardiac evaluation

















Subjective: 





My pain control is good


Objective: 





 Vital Signs











Temp Pulse Resp BP Pulse Ox


 


 36.9 C   106 H  14   131/71 H  94 


 


 02/17/19 07:25  02/17/19 07:25  02/17/19 07:25  02/17/19 07:25  02/17/19 07:25








 Laboratory Results





 02/17/19 04:50 





 02/17/19 04:50 





 











 02/16/19 02/17/19 02/18/19





 05:59 05:59 05:59


 


Intake Total  1963 450


 


Output Total  1650 700


 


Balance  313 -250








 











PT  17.0 SEC (12.0-15.0)  H  02/16/19  19:30    


 


INR  1.37  (0.83-1.16)  H  02/16/19  19:30    














- Time Spent With Patient


Time Spent With Patient: 





25





Physical Exam





- Physical Exam


General Appearance: WD/WN, alert, no apparent distress


Respiratory: chest non-tender, lungs clear, normal breath sounds


Cardiac/Chest: tachycardia


Abdomen: normal bowel sounds, non-tender, soft


Male Genitalia: deferred


Rectal: deferred


Back: Normal inspection


Skin: normal color, warm/dry


Neuro/Psych: no motor/sensory deficits, alert, normal mood/affect, oriented x 3





ICD10 Worksheet


Patient Problems: 


 Problems











Problem Status Onset


 


Anemia Acute  


 


Fever Acute  


 


Tachycardia Acute  


 


Femur fracture, left Acute

## 2019-02-18 ENCOUNTER — HOSPITAL ENCOUNTER (INPATIENT)
Dept: HOSPITAL 80 - BREH | Age: 24
LOS: 4 days | Discharge: HOME HEALTH | DRG: 560 | End: 2019-02-22
Attending: INTERNAL MEDICINE | Admitting: INTERNAL MEDICINE
Payer: COMMERCIAL

## 2019-02-18 VITALS — SYSTOLIC BLOOD PRESSURE: 129 MMHG | DIASTOLIC BLOOD PRESSURE: 81 MMHG

## 2019-02-18 DIAGNOSIS — E87.1: ICD-10-CM

## 2019-02-18 DIAGNOSIS — R09.02: ICD-10-CM

## 2019-02-18 DIAGNOSIS — E86.9: ICD-10-CM

## 2019-02-18 DIAGNOSIS — R00.0: ICD-10-CM
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
## 2019-02-18 DIAGNOSIS — K59.03: ICD-10-CM

## 2019-02-18 DIAGNOSIS — I95.1: ICD-10-CM

## 2019-02-18 DIAGNOSIS — D64.9: ICD-10-CM

## 2019-02-18 DIAGNOSIS — S52.022D: ICD-10-CM

## 2019-02-18 DIAGNOSIS — R91.1: ICD-10-CM

## 2019-02-18 DIAGNOSIS — R19.5: ICD-10-CM
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
## 2019-02-18 DIAGNOSIS — T50.7X5A: ICD-10-CM

## 2019-02-18 DIAGNOSIS — S72.142D: Primary | ICD-10-CM

## 2019-02-18 LAB — PLATELET # BLD: 343 10^3/UL (ref 150–400)

## 2019-02-18 RX ADMIN — ACETAMINOPHEN SCH MG: 500 TABLET ORAL at 21:04

## 2019-02-18 RX ADMIN — ACETAMINOPHEN SCH MG: 500 TABLET ORAL at 06:23

## 2019-02-18 RX ADMIN — PANTOPRAZOLE SODIUM SCH: 40 INJECTION, POWDER, FOR SOLUTION INTRAVENOUS at 09:45

## 2019-02-18 NOTE — ASMTLACE
LACE

 

Length of stay for            Answers:  2 days                                

current admission                                                             

Acuity / Level of             Answers:  No                                    

Care: Did the patient                                                         

have an inpatient                                                             

admission?                                                                    

# of Emergency department     Answers:  1-2                                   

visits in the last 6                                                          

months                                                                        

Score: 3

 

Date Signed:  02/18/2019 12:21 PM

Electronically Signed By:NEGRITO Lerner

## 2019-02-18 NOTE — TRAUMAPN
Trauma Progress Note


Assessment/Plan: 


02/17/19 08:16





Re-admit day #1





Assessment:


Pain control is adequate. He has received two units PRDC, hct up to 26, SATs 

good, he has had a good urine output but remains tachycardic. 





Had a brown/green stool this AM- no blood seen.





Plan:





Consult hospitalist for cardiac evaluation














PAD#2


Assessment:





Pain control,adequate, tachycardia resolved, Hospitalist input appreciated, HCT 

27





Plan:


Return to SNF


Protonix added


Subjective: 





"no problems"


Objective: 





 Vital Signs











Temp Pulse Resp BP Pulse Ox


 


 36.8 C   108 H  11 L  129/81 H  97 


 


 02/18/19 11:34  02/18/19 11:34  02/18/19 11:34  02/18/19 11:34  02/18/19 11:34








 Laboratory Results





 02/18/19 04:46 





 02/17/19 04:50 





 











 02/17/19 02/18/19 02/19/19





 05:59 05:59 05:59


 


Intake Total 1963 4400 


 


Output Total 1650 3325 500


 


Balance 313 1075 -500








 











PT  17.0 SEC (12.0-15.0)  H  02/16/19  19:30    


 


INR  1.37  (0.83-1.16)  H  02/16/19  19:30    














Physical Exam





- Physical Exam


General Appearance: WD/WN, alert, no apparent distress


Respiratory: lungs clear, normal breath sounds


Cardiac/Chest: regular rate, rhythm (at 99)


Abdomen: normal bowel sounds, non-tender, soft


Male Genitalia: deferred


Rectal: deferred


Back: Normal inspection


Skin: normal color, warm/dry


Time Spent w/Patient (minutes): 15

## 2019-02-18 NOTE — SOAPPROG
SOAP Progress Note


Assessment/Plan: 


Assessment:








DEBILITY-nonweightbearing left upper extremity status post ORIF left olecranon 

fracture, status post intramedullary pinning for femur fracture x2.  Currently 

weight-bearing as tolerated left lower extremity.  Demonstrates significant 

left quadriceps weakness and is unable to extend left knee while in seated 

position.  Left shoulder sling in place and to be worn at all times.  

Nonweightbearing left upper extremity.  Has platform walker.  Does not report 

sensory disturbance in left ulnar distribution.  He may have some involvement 

of the left lateral femoral cutaneous nerve.  Has left quadriceps inhibition 

secondary to localized pain.  Physical therapy consult to address left lower 

extremity weakness and gait dysfunction.  Occupational therapy consult to 

address left upper extremity weakness, maximize ability to perform ADLs, 

assistive devices as needed.  Gentle range of motion left shoulder and elbow 

within confines of orthopedic precautions.  Positioning to minimize left hand 

edema.





Pain management.  Scheduled acetaminophen 1000 mg q.8 hours.  And tramadol 50 

mg Q 6 hr.  Hydromorphone 2 mg -4 mg every 4 hr on a as needed basis.  Nursing 

to assess adequacy of pain management Q shift.





Tachycardia.  Hospitalist consultation after transfer off of rehab unit 

indicates this was most likely related to anxiety/pain and possibly volume 

depletion due to anemia.  By medical record review, this improved following 

transfusion 2 units blood.  Will continue to monitor for tachycardia and signs 

of dehydration.  Patient will be encouraged to drink fluids to maximize 

hydration status.





Opiate induced constipation.  Minimize use of hydromorphone.  Has bowel meds 

ordered on p.r.n. Basis.  Constipation should improve with regular hydration, 

limited bed rest and avoidance of opioids if possible.





Anemia-improved following transfer to [facility] blood at [facility].  Will check H&H in a few days.





Hypoxia-resolved.  Patient encouraged to use incentive spirometer 10+ per hour 

while awake.





Prophylaxis.  Per orthopedic surgery, continue rivaroxaban for a total of 10 

days postoperatively which will be 2/21.  He is also on GI prophylaxis with 

pantoprazole.  He was noted to have microscopic guaiac-positive stools on 

readmission to [facility].  Nursing to monitor bowel movements 

for changes consistent with heme-positive findings.





Wound care.  Will change dressings on a p.r.n. Basis.





Follow-up.  He is to follow up with his orthopedic surgeon, Dr. Taylor at [facility] 14 days postop which would be 02/25/2019.

















Plan:





02/18/19 15:14





02/18/19 15:18





02/18/19 15:23





Subjective: 





INTERIM PROGRESS NOTE.  PATIENT READMITTED TO INPATIENT REHAB UNIT AFTER LESS 

THAN 48 HR FOLLOWING TRANSFER OFF OF UNIT ON 2/16/19 AND ADMITTANCE TO [facility] THROUGH EMERGENCY DEPARTMENT.  FOR POSSIBLE 

SEPSIS.  OF CONCERN WAS INCREASING WHITE COUNT, TACHYCARDIA AND HYPOTENSION 

WITH PATIENT REPORTING GENERALLY NOT FEELING WELL AND SOMEWHAT DIAPHORETIC.  

UPON READMISSION BACK TO [facility], THE XARELTO WAS 

DISCONTINUED DUE TO MICROSCOPIC POSITIVE GUAIAC IN STOOLS.  HE RECEIVED A 

TRANSFUSION 2 UNITS OF BLOOD.  HIS TACHYCARDIA RESOLVED TO THE HIGH 90S.  

EVALUATED BY HOSPITALIST AND FELT NOT TO BE SEPTIC.  NO PE SEEN ON REPEAT CTA 

DUPLEX SCAN WAS NEGATIVE.  TACHYCARDIA WAS FELT TO BE POSSIBLY RELATED TO TRAUMA

/PAIN AND ANXIETY.  ALSO FELT TO BE SOMEWHAT VOLUME DEPLETED.  THERE WAS ALSO 

THE POSSIBILITY THAT A FAT EMBOLI WAS CONTRIBUTING TO ONGOING TACHYCARDIA.  

CHEST X-RAY OBTAINED 2/16 WAS NEGATIVE.  CURRENTLY, JUST REPORTS THAT HE FEELS 

MUCH IMPROVED AS COMPARED TO WHEN I LAST SAW HIM ON SATURDAY.  HE DENIES 

FEELING LIGHTHEADED, DENIES SHORTNESS OF BREATH OR CHEST PAIN, DENIES FEELING 

LIGHTHEADED OR DIZZY.  HE REPORTS HIS PAIN IS WELL CONTROLLED.  HE DOES REPORT 

LEFT SUBACROMIAL PAIN AND WEAKNESS WITH ABDUCTION PAST 90.  HE DENIES ELBOW 

PAIN.  HE DOES NOT REPORT ULNAR NEURITIS TYPE SYMPTOMS.  HE STILL HAS QUITE A 

BIT OF LEFT ANTERIOR THIGH PAIN AND HAS DIFFICULTY EXTENDING THE KNEE, 

PARTICULARLY FROM A SEATED POSITION.  HE DOES NOTE SOME INCREASED MID THIGH 

PAIN WITH WEIGHT-BEARING.  HE REPORTS SOME POSSIBLE NUMBNESS IN THE 

DISTRIBUTION OF THE LEFT LATERAL FEMORAL CUTANEOUS NERVE.





Physical Exam





- Physical Exam


General Appearance: WD/WN, alert, no apparent distress


EENT: PERRL/EOMI, No scleral icterus (R), No scleral icterus (L)


Neck: non-tender, full range of motion, supple


Respiratory: chest non-tender, lungs clear, normal breath sounds


Cardiac/Chest: regular rate, rhythm, tachycardia (HEART RATE 108 AND REGULAR), 

No edema


Abdomen: normal bowel sounds, non-tender, soft


Skin: normal color, warm/dry


Extremities: No swelling, No Nataliya's sign


Neuro/Psych: alert, normal mood/affect, oriented x 3, motor weakness (DID NOT 

ASSESS LEFT UPPER EXTREMITY STRENGTH SECONDARY TO SHOULDER SLING IN PLACE.  

DEMONSTRATES GOOD LEFT  STRENGTH NO WEAKNESS OF THE LEFT HAND INTRINSICS.  

UNABLE TO FULLY EXTEND LEFT KNEE WHILE IN SEATED POSITION IN WHEELCHAIR DUE TO 

LEFT THIGH PAIN), sensory deficit (POSSIBLE DECREASED SENSATION DISTRIBUTION 

LEFT LATERAL FEMORAL CUTANEOUS NERVE), No no motor/sensory deficits, No 

abnormal CNs II-XII, No cognition abnormalities





ICD10 Worksheet


Patient Problems: 


 Problems











Problem Status Onset


 


Femur fracture, left Acute  


 


Tachycardia Acute  


 


Anemia Acute  


 


Fever Acute

## 2019-02-18 NOTE — ASMTDCNOTE
Case Management Discharge

 

Discharge Order Complete?     Answers:  Yes                                   

Patient to Obtain             Answers:  Other                         Notes:  [facility]

Medications                                                                   

Transportation Arranged       Answers:  Family/Friends                        

EMTAL[facility] Complete               Answers:  No                                    

Case Management Transport     Answers:  No                                    

Form Complete                                                                 

Faxed Final Orders            Answers:  Yes                                   

Agency/Facility Transfer      Answers:  Yes                                   

Report Printed & Faxed to                                                     

Receiving Agency                                                              

Family Notified               Answers:  No                                    

Discharge Comments            

Notes:

Pts case discussed w/ Muna Ball NP and Dr. Paul. Pt is being d/c'd today back to [facility] 

inpatient rehab. CM coordinated w/ Malorie at inpatient rehab. RAJEEV Ballesteros will call to give report. Pt 

is having family bring him. CM available for changes.







Plan: [facility] rehab

 

Date Signed:  02/18/2019 12:19 PM

Electronically Signed By:NEGRITO Lerner

## 2019-02-18 NOTE — HOSPPROG
Hospitalist Progress Note


Assessment/Plan: 














22 yo M s/p trauma with femur fracture x 2 as well as olecranon fracture 

currently in IP rehab pw tachycardia and ongoing anemia. First encounter on 

this admission, chart reviewed.





*tachycardia


-repeat CTA shows no PE


-improved w transfusion


-can not rule out fat emboli


-heart rate in the upper 90's when I evaluated him





*femur/olecranon fx


-s/p repair





*acute blood loss anemia


-stable


-+ occult stool, no overt signs of bleeding


-hgb and hct have improved





*dvt prophylaxis


-resume Xarelto when ok w surgery





*plan: dc per trauma, David is feeling well and hopeful to return to IP rehab











Subjective: David is feeling well, describes having some wave like sensations 

in his upper left leg.


Objective: 


 Vital Signs











Temp Pulse Resp BP Pulse Ox


 


 36.9 C   93   11 L  134/73 H  97 


 


 02/18/19 07:44  02/18/19 07:44  02/18/19 07:44  02/18/19 07:44  02/18/19 07:44








 Laboratory Results





 02/18/19 04:46 





 02/17/19 04:50 





 











 02/17/19 02/18/19 02/19/19





 05:59 05:59 05:59


 


Intake Total 1963 4400 


 


Output Total 1650 3325 500


 


Balance 313 1075 -500








 











PT  17.0 SEC (12.0-15.0)  H  02/16/19  19:30    


 


INR  1.37  (0.83-1.16)  H  02/16/19  19:30    














- Physical Exam


Constitutional: appears nourished, not in pain


Eyes: PERRL


Ears, Nose, Mouth, Throat: hearing normal


Cardiovascular: regular rate and rhythym


Respiratory: no respiratory distress


Gastrointestinal: normoactive bowel sounds


Skin: warm


Musculoskeletal: generalized weakness, other (sweeling in left upper thigh area)


Neurologic: AAOx3


Psychiatric: interacting appropriately





ICD10 Worksheet


Patient Problems: 


 Problems











Problem Status Onset


 


Anemia Acute  


 


Fever Acute  


 


Tachycardia Acute  


 


Femur fracture, left Acute
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
## 2019-02-18 NOTE — PDIAF
- Diagnosis


Diagnosis: left femur fracture, left olecranon fx, hx fat embolism, anemia


Code Status: Full Code





- Medication Management


Discharge Medications: electronically signed and located in the Home Medication 

List.





- Orders


Services needed: Registered Nurse, Physical Therapy, Occupational Therapy


Diet Recommendation: no restrictions on diet


Diet Texture: Regular Texture Diet





- Follow Up Care


Current Providers and Referrals: 


Patient,NotPresent [Unknown] - As per Instructions

## 2019-02-18 NOTE — GDS
[f 
rep st]



                                                             DISCHARGE SUMMARY





READMISSION DIAGNOSIS:  Tachycardia.



HISTORY:  This patient was admitted to [facility] on the 10th 
with a left femur fracture (proximal, 2 sites).  He developed a postoperative 
tachycardia, which was finally attributed to a fat embolism by diagnosis of 
exclusion.  He was dismissed on the 15th with transfer to a skilled nursing 
facility.  He came back on the 16th with again tachycardia. 



At that time, his hematocrit was stable at 21.



HOSPITAL COURSE:  He is on Xarelto.  His stool was microscopically positive (
guaiac), but was grossly negative for blood. 



He was taken off the Xarelto.  He was transfused 2 units of blood.  His 
tachycardia resolved to the high 90s.  Evaluation by the hospitalist group did 
not come up with any other additional diagnoses. 



A PE scan was negative.  A duplex scan was negative. 



He is set at this point to return to his skilled nursing facility.  [facility] 
discharge form has been completed.





Job #:  [id number]

MTDD

## 2019-02-19 RX ADMIN — ACETAMINOPHEN SCH MG: 500 TABLET ORAL at 20:46

## 2019-02-19 RX ADMIN — PANTOPRAZOLE SODIUM SCH MG: 40 TABLET, DELAYED RELEASE ORAL at 09:08

## 2019-02-19 RX ADMIN — RIVAROXABAN SCH MG: 10 TABLET, FILM COATED ORAL at 09:08

## 2019-02-19 RX ADMIN — ACETAMINOPHEN SCH MG: 500 TABLET ORAL at 13:39

## 2019-02-19 RX ADMIN — ACETAMINOPHEN SCH MG: 500 TABLET ORAL at 06:27

## 2019-02-19 NOTE — SOAPPROG
SOAP Progress Note


Assessment/Plan: 


Assessment:








DEBILITY-nonweightbearing left upper extremity status post ORIF left olecranon 

fracture, status post intramedullary pinning for femur fracture x2.  Currently 

weight-bearing as tolerated left lower extremity.  Demonstrates significant 

left quadriceps weakness and is unable to extend left knee while in seated 

position.  Left shoulder sling in place and to be worn at all times.  

Nonweightbearing left upper extremity.  Has platform walker.  Does not report 

sensory disturbance in left ulnar distribution.  He may have some involvement 

of the left lateral femoral cutaneous nerve.  Has left quadriceps inhibition 

secondary to localized pain.  Physical therapy consult to address left lower 

extremity weakness and gait dysfunction.  Occupational therapy consult to 

address left upper extremity weakness, maximize ability to perform ADLs, 

assistive devices as needed.  Gentle range of motion left shoulder and elbow 

within confines of orthopedic precautions.  Positioning to minimize left hand 

edema.





Left quadriceps weakness -patient encouraged to perform isometric quadriceps 

contractions were while lying in bed and left leg extensions when sitting in 

wheelchair.





Pain management.  Scheduled acetaminophen 1000 mg q.8 hours.  Tramadol 50 mg Q 

6 hr.  Hydromorphone 2 mg -4 mg every 4 hr on a as needed basis.  Nursing to 

assess adequacy of pain management Q shift.  HE REPORTS GOOD PAIN CONTROL AND 

DOES NOT FEEL THAT HE NEEDS ADDITIONAL PAIN MEDICATIONS BEING ON TYLENOL AT 

THIS POINT.





Tachycardia.  HEART RATE THIS MORNING 1 0 WEIGHT.  Hospitalist consultation 

after transfer off of rehab unit indicates this was most likely related to 

anxiety/pain and possibly volume depletion due to anemia.  By medical record 

review, this improved following transfusion 2 units blood.  Will continue to 

monitor for tachycardia and signs of dehydration.  Patient will be encouraged 

to drink fluids to maximize hydration status.





Opiate induced constipation.  Minimize use of hydromorphone.  Has bowel meds 

ordered on p.r.n. Basis.  Constipation should improve with regular hydration, 

limited bed rest and avoidance of opioids if possible.





Anemia-improved following transfer to [facility] blood at [facility].  Will check H&H in a few days.





Hypoxia-resolved.  Patient encouraged to use incentive spirometer 10+ per hour 

while awake.





Prophylaxis.  Per orthopedic surgery, continue rivaroxaban for a total of 10 

days postoperatively which will be 2/21.  He is also on GI prophylaxis with 

pantoprazole.  He was noted to have microscopic guaiac-positive stools on 

readmission to [facility].  Nursing to monitor bowel movements 

for changes consistent with heme-positive findings.





Wound care.  NURSING WILL CONTACT ORTHOPEDICS TODAY TO ASCERTAIN FREQUENCY OF 

LEFT UPPER EXTREMITY DRESSING CHANGE.  WILL PLACE ORDER TO CHANGE LEFT LOWER 

EXTREMITY DRESSING DAILY.





Follow-up.  He is to follow up with his orthopedic surgeon, Dr. Taylor at [facility] 14 days postop which would be 02/25/2019.




















02/19/19 09:31





Subjective: 





Just reports he is doing well this morning.  He does not report significant 

left upper or left lower extremity pain.  Most of his discomfort arises from 

lying in bed on his back.  He notes less left quadriceps discomfort when he 

contracts the left quadriceps.  He denies and left upper extremity numbness.  

He does report some numbness in the lateral aspect of the left thigh and around 

the surgical incision.


Objective: 





 Vital Signs











Temp Pulse Resp BP Pulse Ox


 


 36.7 C   104 H  16   133/76 H  95 


 


 02/19/19 06:33  02/19/19 09:10  02/19/19 09:10  02/19/19 09:10  02/19/19 09:10








 











 02/18/19 02/19/19 02/20/19





 05:59 05:59 05:59


 


Intake Total  1100 260


 


Output Total  550 


 


Balance  550 260














Physical Exam





- Physical Exam


General Appearance: WD/WN, alert, no apparent distress


Respiratory: lungs clear, normal breath sounds


Abdomen: normal bowel sounds, non-tender, soft


Skin: warm/dry, other (Left upper extremity.)


Extremities: other (No left hand swelling.), No swelling, No Nataliya's sign


Neuro/Psych: alert, normal mood/affect, oriented x 3, motor weakness (

Quadriceps 2+ 3-/5.  Ability to extend left knee improving.), sensory deficit (

Some decreased sensation distribution left lateral femoral cutaneous nerve)





ICD10 Worksheet


Patient Problems: 


 Problems











Problem Status Onset


 


Anemia Acute  


 


Femur fracture, left Acute  


 


Fever Acute  


 


Tachycardia Acute

## 2019-02-20 RX ADMIN — RIVAROXABAN SCH MG: 10 TABLET, FILM COATED ORAL at 08:23

## 2019-02-20 RX ADMIN — ACETAMINOPHEN SCH MG: 500 TABLET ORAL at 07:05

## 2019-02-20 RX ADMIN — PANTOPRAZOLE SODIUM SCH MG: 40 TABLET, DELAYED RELEASE ORAL at 08:23

## 2019-02-20 RX ADMIN — ACETAMINOPHEN SCH MG: 500 TABLET ORAL at 12:48

## 2019-02-20 RX ADMIN — ACETAMINOPHEN SCH MG: 500 TABLET ORAL at 21:07

## 2019-02-20 NOTE — SOAPPROG
SOAP Progress Note


Assessment/Plan: 


Assessment:








DEBILITY-nonweightbearing left upper extremity status post ORIF left olecranon 

fracture, status post intramedullary pinning for femur fracture x2.  USING LEFT 

PLATFORM WALKER WITHOUT MUCH DIFFICULTY.  CONTACTED HIS ORTHOPEDIC SURGEON, DR. TAYLOR, LEFT MESSAGE FOR HIS PA TO CONTACT TO AFFIRM THAT WITH CURRENT LEFT UPPER 

EXTREMITY WEIGHT-BEARING STATUS HE CAN USE PLATFORM WALKER.  CALL THUS FAR NOT 

RETURNED.  Currently weight-bearing as tolerated left lower extremity.  

Demonstrates significant left quadriceps weakness and is unable to extend left 

knee while in seated position.  Left shoulder sling in place and to be worn at 

all times.   Does not report sensory disturbance in left ulnar distribution.  

He may have some involvement of the left lateral femoral cutaneous nerve.  Has 

left quadriceps inhibition secondary to localized pain.  Physical therapy 

consult to address left lower extremity weakness and gait dysfunction.  

Occupational therapy consult to address left upper extremity weakness, maximize 

ability to perform ADLs, assistive devices as needed.  Gentle range of motion 

left shoulder and elbow within confines of orthopedic precautions.  Positioning 

to minimize left hand edema.





LEFT SHOULDER PAIN AND DECREASED ACTIVE RANGE OF MOTION.  POSSIBLE PARTIAL 

VERSUS FULL-THICKNESS ROTATOR CUFF TEAR.  AT LENGTHY DISCUSSION WITH PATIENT 

REGARDING THIS.  ONCE LEFT ELBOW IS HEALED AND HE IS OUT OF CAST, MRI OF LEFT 

SHOULDER SHOULD BE CONSIDERED TO RULE OUT ROTATOR CUFF TEAR IF ACTIVE 

GLENOHUMERAL RANGE OF MOTION HAS NOT RETURNED TO NORMAL.  HE CAN DISCUSS THIS 

FURTHER WITH DR. TAYLOR ON HIS FOLLOW-UP APPOINTMENT.





Left quadriceps weakness -patient encouraged to perform isometric quadriceps 

contractions were while lying in bed and left leg extensions when sitting in 

wheelchair.





Pain management.  Scheduled acetaminophen 1000 mg q.8 hours.    Hydromorphone 2 

mg -4 mg every 4 hr on a as needed basis.  Nursing to assess adequacy of pain 

management Q shift.  HE REPORTS GOOD PAIN CONTROL AND DOES NOT FEEL THAT HE 

NEEDS ADDITIONAL PAIN MEDICATIONS BEING ON TYLENOL AT THIS POINT.





Tachycardia.  PULSE THIS MORNING WAS 79 AND REGULAR.  Hospitalist consultation 

after transfer off of rehab unit indicates this was most likely related to 

anxiety/pain and possibly volume depletion due to anemia.  By medical record 

review, this improved following transfusion 2 units blood.  Will continue to 

monitor for tachycardia and signs of dehydration.  Patient will be encouraged 

to drink fluids to maximize hydration status.





Opiate induced constipation.  RESOLVED.  NURSING REPORTS NORMAL BOWEL MOVEMENT 

THIS MORNING.





Anemia-improved following transfer to [facility] blood at [facility]

, [place].  Will check H&H in a few days.





Hypoxia-resolved.  Patient encouraged to use incentive spirometer 10+ per hour 

while awake.





Prophylaxis.  Per orthopedic surgery, continue rivaroxaban for a total of 10 

days postoperatively which will be 2/21.  He is also on GI prophylaxis with 

pantoprazole.  He was noted to have microscopic guaiac-positive stools on 

readmission to [facility].  Nursing to monitor bowel movements 

for changes consistent with heme-positive findings.





Wound care.  NURSING WILL CONTACT ORTHOPEDICS  TO ASCERTAIN FREQUENCY OF LEFT 

UPPER EXTREMITY DRESSING CHANGE.  WILL PLACE ORDER TO CHANGE LEFT LOWER 

EXTREMITY DRESSING DAILY.





Follow-up.  He is to follow up with his orthopedic surgeon, Dr. Taylor at [facility] 14 days postop which would be 02/25/2019.




















02/19/19 09:31





02/20/19 10:59





Subjective: 





Addendum to 2/20/19 progress note.  David requires the following ambulatory aid

:  Walker with left platform attachment to address mobility limitation that 

significantly impairs 1 or more mobility related activities of daily living in 

the home.  Patient has demonstrated to therapy staff that he is able to use the 

walker safely.  His current functional mobility deficit its cannot be resolved 

with a cane.





Paddy Philip MD


Objective: 





 Vital Signs











Temp Pulse Resp BP Pulse Ox


 


 36.7 C   79   18   98/66 L  96 


 


 02/20/19 07:13  02/20/19 07:13  02/20/19 07:13  02/20/19 07:13  02/20/19 07:13








 











 02/19/19 02/20/19 02/21/19





 05:59 05:59 05:59


 


Intake Total 1100 1510 100


 


Output Total 550 300 


 


Balance 550 1210 100














ICD10 Worksheet


Patient Problems: 


 Problems











Problem Status Onset


 


Anemia Acute  


 


Femur fracture, left Acute  


 


Fever Acute  


 


Tachycardia Acute

## 2019-02-20 NOTE — SOAPPROG
SOAP Progress Note


Assessment/Plan: 


Assessment:








DEBILITY-nonweightbearing left upper extremity status post ORIF left olecranon 

fracture, status post intramedullary pinning for femur fracture x2.  USING LEFT 

PLATFORM WALKER WITHOUT MUCH DIFFICULTY.  CONTACTED HIS ORTHOPEDIC SURGEON, DR. TAYLOR, LEFT MESSAGE FOR HIS PA TO CONTACT TO AFFIRM THAT WITH CURRENT LEFT UPPER 

EXTREMITY WEIGHT-BEARING STATUS HE CAN USE PLATFORM WALKER.  CALL THUS FAR NOT 

RETURNED.  Currently weight-bearing as tolerated left lower extremity.  

Demonstrates significant left quadriceps weakness and is unable to extend left 

knee while in seated position.  Left shoulder sling in place and to be worn at 

all times.   Does not report sensory disturbance in left ulnar distribution.  

He may have some involvement of the left lateral femoral cutaneous nerve.  Has 

left quadriceps inhibition secondary to localized pain.  Physical therapy 

consult to address left lower extremity weakness and gait dysfunction.  

Occupational therapy consult to address left upper extremity weakness, maximize 

ability to perform ADLs, assistive devices as needed.  Gentle range of motion 

left shoulder and elbow within confines of orthopedic precautions.  Positioning 

to minimize left hand edema.





LEFT SHOULDER PAIN AND DECREASED ACTIVE RANGE OF MOTION.  POSSIBLE PARTIAL 

VERSUS FULL-THICKNESS ROTATOR CUFF TEAR.  AT LENGTHY DISCUSSION WITH PATIENT 

REGARDING THIS.  ONCE LEFT ELBOW IS HEALED AND HE IS OUT OF CAST, MRI OF LEFT 

SHOULDER SHOULD BE CONSIDERED TO RULE OUT ROTATOR CUFF TEAR IF ACTIVE 

GLENOHUMERAL RANGE OF MOTION HAS NOT RETURNED TO NORMAL.  HE CAN DISCUSS THIS 

FURTHER WITH DR. TAYLOR ON HIS FOLLOW-UP APPOINTMENT.





Left quadriceps weakness -patient encouraged to perform isometric quadriceps 

contractions were while lying in bed and left leg extensions when sitting in 

wheelchair.





Pain management.  Scheduled acetaminophen 1000 mg q.8 hours.    Hydromorphone 2 

mg -4 mg every 4 hr on a as needed basis.  Nursing to assess adequacy of pain 

management Q shift.  HE REPORTS GOOD PAIN CONTROL AND DOES NOT FEEL THAT HE 

NEEDS ADDITIONAL PAIN MEDICATIONS BEING ON TYLENOL AT THIS POINT.





Tachycardia.  PULSE THIS MORNING WAS 79 AND REGULAR.  Hospitalist consultation 

after transfer off of rehab unit indicates this was most likely related to 

anxiety/pain and possibly volume depletion due to anemia.  By medical record 

review, this improved following transfusion 2 units blood.  Will continue to 

monitor for tachycardia and signs of dehydration.  Patient will be encouraged 

to drink fluids to maximize hydration status.





Opiate induced constipation.  RESOLVED.  NURSING REPORTS NORMAL BOWEL MOVEMENT 

THIS MORNING.





Anemia-improved following transfer to [facility] blood at [facility]

, Foot [place].  Will check H&H in a few days.





Hypoxia-resolved.  Patient encouraged to use incentive spirometer 10+ per hour 

while awake.





Prophylaxis.  Per orthopedic surgery, continue rivaroxaban for a total of 10 

days postoperatively which will be 2/21.  He is also on GI prophylaxis with 

pantoprazole.  He was noted to have microscopic guaiac-positive stools on 

readmission to [facility].  Nursing to monitor bowel movements 

for changes consistent with heme-positive findings.





Wound care.  NURSING WILL CONTACT ORTHOPEDICS  TO ASCERTAIN FREQUENCY OF LEFT 

UPPER EXTREMITY DRESSING CHANGE.  WILL PLACE ORDER TO CHANGE LEFT LOWER 

EXTREMITY DRESSING DAILY.





Follow-up.  He is to follow up with his orthopedic surgeon, Dr. Taylor at [facility] for [facility] 14 days postop which would be 02/25/2019.




















02/19/19 09:31





02/20/19 10:59





Subjective: 


He reports mild left shoulder pain with inability to fully [place] flex or abduct.

  Denies left elbow pain or left hand paresthesias while using platform walker.

  Denies increased left thigh pain at incision site.  Reports sleeping poorly 

but declines sleep aid.


Objective: 





 Vital Signs











Temp Pulse Resp BP Pulse Ox


 


 36.7 C   79   18   98/66 L  96 


 


 02/20/19 07:13  02/20/19 07:13  02/20/19 07:13  02/20/19 07:13  02/20/19 07:13








 











 02/19/19 02/20/19 02/21/19





 05:59 05:59 05:59


 


Intake Total 1100 1510 100


 


Output Total 550 300 


 


Balance 550 1210 100














Physical Exam





- Physical Exam


General Appearance: WD/WN, alert, no apparent distress


Respiratory: lungs clear, normal breath sounds


Abdomen: non-tender, soft


Extremities: normal range of motion (Unable to fully flex and abduct left 

glenohumeral joint, passive range of motion is within functional limits.  Mild 

tenderness insertion of left rotator cuff tendon.  Mild tenderness over left 

supraspinatus and latissimus dorsi insertion.  No left hand swelling.  No 

weakness of left hand intrinsics.), swelling, No Nataliya's sign


Neuro/Psych: motor weakness, No sensory deficit (No sensory deficit left hand.  

Left shoulder girdle weakness anterior and lateral deltoid, supraspinatus, 

external rotators.)





ICD10 Worksheet


Patient Problems: 


 Problems











Problem Status Onset


 


Anemia Acute  


 


Femur fracture, left Acute  


 


Fever Acute  


 


Tachycardia Acute

## 2019-02-21 RX ADMIN — PANTOPRAZOLE SODIUM SCH MG: 40 TABLET, DELAYED RELEASE ORAL at 09:32

## 2019-02-21 RX ADMIN — RIVAROXABAN SCH MG: 10 TABLET, FILM COATED ORAL at 09:32

## 2019-02-21 RX ADMIN — ACETAMINOPHEN SCH MG: 500 TABLET ORAL at 20:41

## 2019-02-21 RX ADMIN — ACETAMINOPHEN SCH MG: 500 TABLET ORAL at 14:12

## 2019-02-21 RX ADMIN — ACETAMINOPHEN SCH MG: 500 TABLET ORAL at 06:34
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
## 2019-02-21 NOTE — PDOREHIP
Admission [facility]





- Admission - 3 Day Assessment Period


Admission Date/Day 1: 02/18/19


Day 2: 02/19/19


Day 3: 02/20/19





- Active Diagnoses


Comorbidities and Co-existing Conditions at Admission: 27989. None of the Above





- Skin Conditions


Unhealed Pressure Ulcer (1 or more/Stage 1 or >)-Admission: 0. No


# Stage 1 Pressure Ulcers-Admission: 0


# Stage 2 Pressure Ulcers-Admission: 0


# Stage 3 Pressure Ulcers-Admission: 0


# Stage 4 Pressure Ulcers-Admission: 0


# Unstageable Pressure Ulcers (Non-remove Dress)-Admission: 0


# Unstageable Pressure Ulcers (Slough/Eschar)-Admission: 0


# Unstageable Pressure Ulcers (Deep Tissue Injury)-Admission: 0





Discharge [facility]





- Discharge  - 3 Day Assessment Period


2 Days Prior to Anticipated Discharge Date: 02/20/19


1 Day Prior to Anticipated Discharge Date: 02/21/19


Anticipated Discharge Date: 02/22/19

## 2019-02-22 VITALS — DIASTOLIC BLOOD PRESSURE: 67 MMHG | SYSTOLIC BLOOD PRESSURE: 114 MMHG

## 2019-02-22 RX ADMIN — PANTOPRAZOLE SODIUM SCH MG: 40 TABLET, DELAYED RELEASE ORAL at 08:26

## 2019-02-22 RX ADMIN — ACETAMINOPHEN SCH MG: 500 TABLET ORAL at 07:25

## 2019-02-22 NOTE — GDS
[f 
rep st]



                                                             DISCHARGE SUMMARY





ADMITTING DIAGNOSIS:  Multiple fractures.



DISCHARGE DIAGNOSIS:  Multiple fractures.



OTHER DIAGNOSES:  

1.  Anemia.

2.  Tachycardia.



COMPLICATIONS:  There was tachycardia.



CONSULTATIONS:  He had an observation stay at the hospital.



PROCEDURES:  He received a blood transfusion.



HISTORY/HOSPITAL COURSE:  This patient was initially admitted to inpatient 
rehabilitation on 02/15/2019.  He presented to [facility] on 02/
10/2019, having collided with a tree while snowboarding.  He suffered a left 
olecranon fracture and two left femoral fractures.  He had ORIF of left 
olecranon, femoral intramedullary nailing of fracture.  After brief hospital 
stay he came to rehabilitation.  He was nonweightbearing on the left upper 
extremity, though allowed to use a platform walker.  He returned to the 
hospital with tachycardia on 02/16/2019.  He had evaluation for a pulmonary 
embolus with a CT angiogram, which was negative.  He received a blood 
transfusion, and his tachycardia improved.  He was then discharged back to 
inpatient rehabilitation.  He made good progress in rehabilitation.  His 
functional independence measure was 86 on 2/18/2019, which is consistent with 
assisted living level of function.  Mobility was contact guard to standby 
assist using a platform walker or a platform crutch on the left.  He required 
standby assist for his activities of daily living. 



His pain was adequately managed with acetaminophen alone.  Tachycardia was 
mostly resolved. 



He had hypoxia in his initial rehabilitation stay, but this resolved after his 
blood transfusion. 



He was maintained on rivaroxaban per orders of Orthopedic Surgery for 10 days 
postoperatively.  Rivaroxaban discontinued on 02/21/2019.



DISCHARGE PLAN:  Condition is good.  Disposition to his girlfriend's home.  
Activity is ad rody, though he requires standby assist for some mobility and ADL 
tasks.



DIET:  Regular.



ALLERGIES:  There are no known drug allergies.



MEDICATIONS UPON DISCHARGE:  

1.  Acetaminophen 1000 mg p.o. q.8 hours.

2.  Senna/docusate 1 to 2 tabs p.o. twice daily.



ISSUES TO BE ADDRESSED AT FOLLOWUP:  

1.  Mobility and activities of daily living.  He will continue home care, 
physical therapy and can follow up with his primary care provider.

2.  Postsurgical aftercare.  His left upper extremity splint was not removed.  
He will have followup with orthopedic surgeon, Dr. Taylor for assessment of his 
incisions and staple removal.  Continued weightbearing restriction. 

3.  Anemia was much improved, and he can follow up with primary care provider 
for routine blood testing.



Copy requested to:

Morro Arenas



Job #:  [id number]

[employer]